# Patient Record
Sex: FEMALE | Race: WHITE | Employment: FULL TIME | URBAN - NONMETROPOLITAN AREA
[De-identification: names, ages, dates, MRNs, and addresses within clinical notes are randomized per-mention and may not be internally consistent; named-entity substitution may affect disease eponyms.]

---

## 2016-08-23 LAB — HBA1C MFR BLD: 6.7 %

## 2016-08-24 LAB
CHOLESTEROL, TOTAL: 172 MG/DL
CHOLESTEROL/HDL RATIO: 3.4
HDLC SERPL-MCNC: 51 MG/DL (ref 35–70)
LDL CHOLESTEROL CALCULATED: 95 MG/DL (ref 0–160)
TRIGL SERPL-MCNC: 129 MG/DL
VLDLC SERPL CALC-MCNC: 26 MG/DL

## 2017-02-27 ENCOUNTER — OFFICE VISIT (OUTPATIENT)
Dept: OTHER | Age: 47
End: 2017-02-27

## 2017-02-27 VITALS — WEIGHT: 201.4 LBS | HEIGHT: 63 IN | BODY MASS INDEX: 35.68 KG/M2

## 2017-02-27 DIAGNOSIS — Z71.3 DIETARY COUNSELING AND SURVEILLANCE: Primary | ICD-10-CM

## 2017-02-27 DIAGNOSIS — E11.9 TYPE 2 DIABETES MELLITUS WITHOUT COMPLICATION, UNSPECIFIED LONG TERM INSULIN USE STATUS: ICD-10-CM

## 2017-02-27 PROCEDURE — 97802 MEDICAL NUTRITION INDIV IN: CPT | Performed by: DIETITIAN, REGISTERED

## 2017-04-05 ENCOUNTER — OFFICE VISIT (OUTPATIENT)
Dept: OTHER | Age: 47
End: 2017-04-05

## 2017-04-05 VITALS — WEIGHT: 197.8 LBS | BODY MASS INDEX: 35.05 KG/M2 | HEIGHT: 63 IN

## 2017-04-05 DIAGNOSIS — E11.9 TYPE 2 DIABETES MELLITUS WITHOUT COMPLICATION, UNSPECIFIED LONG TERM INSULIN USE STATUS: Primary | ICD-10-CM

## 2017-04-05 DIAGNOSIS — Z71.3 DIETARY COUNSELING AND SURVEILLANCE: ICD-10-CM

## 2017-12-07 ENCOUNTER — HOSPITAL ENCOUNTER (OUTPATIENT)
Dept: WOMENS IMAGING | Age: 47
Discharge: HOME | End: 2017-12-07
Payer: COMMERCIAL

## 2017-12-07 DIAGNOSIS — Z13.9 VISIT FOR SCREENING: ICD-10-CM

## 2017-12-07 PROCEDURE — 77063 BREAST TOMOSYNTHESIS BI: CPT

## 2017-12-21 ENCOUNTER — HOSPITAL ENCOUNTER (EMERGENCY)
Age: 47
Discharge: HOME | End: 2017-12-21
Attending: EMERGENCY MEDICINE
Payer: COMMERCIAL

## 2017-12-21 ENCOUNTER — APPOINTMENT (OUTPATIENT)
Dept: CT IMAGING | Age: 47
End: 2017-12-21
Payer: COMMERCIAL

## 2017-12-21 VITALS
WEIGHT: 196.21 LBS | HEART RATE: 88 BPM | SYSTOLIC BLOOD PRESSURE: 133 MMHG | HEIGHT: 66 IN | RESPIRATION RATE: 17 BRPM | OXYGEN SATURATION: 100 % | DIASTOLIC BLOOD PRESSURE: 71 MMHG | BODY MASS INDEX: 31.53 KG/M2

## 2017-12-21 DIAGNOSIS — R07.89 ATYPICAL CHEST PAIN: Primary | ICD-10-CM

## 2017-12-21 LAB
ALBUMIN SERPL-MCNC: 3.5 GM/DL (ref 3.5–5)
ALP BLD-CCNC: 64 U/L (ref 46–116)
ALT SERPL-CCNC: 23 U/L (ref 12–78)
ANION GAP: 10 MEQ/L (ref 8–16)
AST SERPL-CCNC: 21 U/L (ref 15–37)
BASOPHILS # BLD: 0.5 % (ref 0–3)
BILIRUB SERPL-MCNC: 0.3 MG/DL (ref 0.2–1)
BUN BLDV-MCNC: 14 MG/DL (ref 7–18)
CHLORIDE BLD-SCNC: 104 MEQ/L (ref 98–107)
CO2: 24 MEQ/L (ref 21–32)
CREAT SERPL-MCNC: 0.8 MG/DL (ref 0.6–1.1)
EKG ATRIAL RATE: 109 BPM
EKG P AXIS: 63 DEGREES
EKG P-R INTERVAL: 154 MS
EKG Q-T INTERVAL: 348 MS
EKG QRS DURATION: 92 MS
EKG QTC CALCULATION (BAZETT): 468 MS
EKG R AXIS: -27 DEGREES
EKG T AXIS: 50 DEGREES
EKG VENTRICULAR RATE: 109 BPM
EOSINOPHILS RELATIVE PERCENT: 1.9 % (ref 0–4)
GFR, ESTIMATED: 82 ML/MIN/1.73M2
GLUCOSE BLD-MCNC: 156 MG/DL (ref 74–106)
HCT VFR BLD CALC: 40.6 % (ref 37–47)
HEMOGLOBIN: 13.2 GM/DL (ref 12–16)
LIPASE: 138 U/L (ref 65–230)
LYMPHOCYTES # BLD: 18 % (ref 15–47)
MCH RBC QN AUTO: 26.3 PG (ref 27–31)
MCHC RBC AUTO-ENTMCNC: 32.5 GM/DL (ref 33–37)
MCV RBC AUTO: 80.8 FL (ref 81–99)
MONOCYTES: 3.2 % (ref 0–12)
PDW BLD-RTO: 13.8 % (ref 11.5–14.5)
PLATELET # BLD: 313 THOU/MM3 (ref 130–400)
PMV BLD AUTO: 7.6 MCM (ref 7.4–10.4)
POC CALCIUM: 8.7 MG/DL (ref 8.5–10.1)
POTASSIUM SERPL-SCNC: 3.9 MEQ/L (ref 3.5–5.1)
RBC # BLD: 5.03 MILL/MM3 (ref 4.2–5.4)
SEGS: 76.4 % (ref 43–75)
SODIUM BLD-SCNC: 138 MEQ/L (ref 136–145)
TOTAL PROTEIN: 7.6 GM/DL (ref 6.4–8.2)
TROPONIN I: 0.09 NG/ML
WBC # BLD: 9.3 THOU/MM3 (ref 4.8–10.8)

## 2017-12-21 PROCEDURE — 85025 COMPLETE CBC W/AUTO DIFF WBC: CPT

## 2017-12-21 PROCEDURE — 99285 EMERGENCY DEPT VISIT HI MDM: CPT

## 2017-12-21 PROCEDURE — 6360000004 HC RX CONTRAST MEDICATION: Performed by: EMERGENCY MEDICINE

## 2017-12-21 PROCEDURE — 83690 ASSAY OF LIPASE: CPT

## 2017-12-21 PROCEDURE — 80053 COMPREHEN METABOLIC PANEL: CPT

## 2017-12-21 PROCEDURE — 84484 ASSAY OF TROPONIN QUANT: CPT

## 2017-12-21 PROCEDURE — 71275 CT ANGIOGRAPHY CHEST: CPT

## 2017-12-21 PROCEDURE — 93005 ELECTROCARDIOGRAM TRACING: CPT

## 2017-12-21 PROCEDURE — 36415 COLL VENOUS BLD VENIPUNCTURE: CPT

## 2017-12-21 RX ADMIN — IOPAMIDOL 100 ML: 755 INJECTION, SOLUTION INTRAVENOUS at 06:23

## 2017-12-21 ASSESSMENT — HEART SCORE: ECG: 1

## 2017-12-21 ASSESSMENT — PAIN DESCRIPTION - ORIENTATION: ORIENTATION: MID

## 2017-12-21 ASSESSMENT — PAIN DESCRIPTION - LOCATION: LOCATION: CHEST

## 2017-12-21 ASSESSMENT — PAIN DESCRIPTION - DESCRIPTORS: DESCRIPTORS: PRESSURE

## 2017-12-21 ASSESSMENT — PAIN DESCRIPTION - PAIN TYPE: TYPE: ACUTE PAIN

## 2017-12-21 ASSESSMENT — PAIN SCALES - GENERAL: PAINLEVEL_OUTOF10: 5

## 2017-12-21 ASSESSMENT — PAIN DESCRIPTION - PROGRESSION: CLINICAL_PROGRESSION: NOT CHANGED

## 2017-12-21 NOTE — ED NOTES
Pt released ambulatory in stable condition. Resp easy, non-labored. Skin warm, dry. Color pink. AVS and cardiology appt reviewed. Pt voiced understanding.      Yesica Whiting RN  12/21/17 8317

## 2017-12-21 NOTE — ED NOTES
Pt resting on cot. States feeling better at this time. States pain down to 1/10. Respirations remain easy and unlabored. No signs of distress noted.       Bailey Barber RN  12/21/17 3399

## 2017-12-21 NOTE — ED TRIAGE NOTES
Pt states at approximately 2200 last night she developed a rapid heart rate and chest pressure. Rapid heart rate has subsided but chest pressure remains. Denies any shortness of breath or other symptoms.

## 2017-12-21 NOTE — ED NOTES
Pt back to room per wheelchair. Pt states pain remains minimal at this time. Pt informed all lab studies back at this time and awaiting CT results. Pt given water to drink.       Jose Enrique Felix RN  12/21/17 2303

## 2017-12-21 NOTE — ED PROVIDER NOTES
eMERGENCY dEPARTMENT eNCOUnter      [address] [facility]    Chief Complaint   Patient presents with    Chest Pain       HPI    Celia Simon is a 52 y.o. female who presents  Above-noted complaint. Patient presents with chest pain. States she felt rapid heartbeat with a pulse rate in the 120s and didn't think much of a tonic can be distance of more anxiety she's had the past.  She then developed some having some chest pain and discomfort may be some pain into her back. Describes it as pain about 4 out of 10. No associated weakness nausea vomiting or diaphoresis although felt like it could be a little like reflux. She took a Mylanta without a lot of changes. PAST MEDICAL HISTORY    Past Medical History:   Diagnosis Date    Diabetes mellitus ([facility] [address]        SURGICAL HISTORY    Past Surgical History:   Procedure Laterality Date     SECTION      CHOLECYSTECTOMY      DILATION AND CURETTAGE OF UTERUS     [address]?  TUBAL LIGATION         CURRENT MEDICATIONS    Current Outpatient Rx   Medication Sig Dispense Refill    Empagliflozin-Metformin HCl (SYNJARDY PO) Take by mouth 2 times daily 500 mg tablet 1 tablet am and 1tablet pm      escitalopram (LEXAPRO) 10 MG tablet Take 10 mg by mouth See Admin Instructions. 1/2 TABLET DAILY.  MULTIPLE VITAMIN PO Take  by mouth daily.  Cyanocobalamin (VITAMIN B 12 PO) Take  by mouth daily.  Cholecalciferol (VITAMIN D PO) Take  by mouth daily.            ALLERGIES    Allergies   Allergen Reactions    Cortizone-10 [Hydrocortisone]     Decongestant [Albertsons Di Bromm]     Red Dye        FAMILY HISTORY    Family History   Problem Relation Age of Onset    Breast Cancer Neg Hx     Ovarian Cancer Neg Hx        SOCIAL HISTORY    Social History     Social History    Marital status:      Spouse name: N/A    Number of children: N/A    Years of education: N/A     Social History Main Topics    Smoking status: Never Smoker    Smokeless tobacco: Never Used    Alcohol use No    Drug use: No    Sexual activity: Not Asked     Other Topics Concern    None     Social History Narrative    None       REVIEW OF SYSTEMS    Positive for chest pain. No abdominal pain. No urinary symptoms. No dysuria hematuria. All systems negative except as marked. PHYSICAL EXAM    VITAL SIGNS: BP (!) 144/75   Pulse 97   Resp 15   Ht 5' 6\" (1.676 m)   Wt 196 lb 3.4 oz (89 kg)   SpO2 99%   BMI 31.67 kg/m²    Constitutional:  Alert not toxtic or ill,    HENT:  Normocephalic, Atraumatic, Bilateral external ears normal, Oropharynx moist, No oral exudates, Nose normal.  Cervical Spine: Normal range of motion,  No stridor. No tenderness, Supple,  Eyes:  No discharge or  Swelling,Conjunctiva normal, PERRL, EOMI,  Respiratory: No respiratory distress, Normal breath sounds,  No wheezing, No chest tenderness. Cardiovascular:  Normal heart rate, Normal rhythm, No murmurs, No rubs, No gallops. GI:  No reproducible pain, Bowel sounds normal, Soft, No masses, No pulsatile masses. No tenderness  Musculoskeletal:  Intact distal pulses, No edema, No tenderness, No cyanosis, No clubbing. Good range of motion in all major joints. No tenderness to palpation or major deformities noted. Back:No tenderness. Integument:  Warm, Dry, No erythema, No rash (on exposed areas)   Lymphatic:  No lymphadenopathy noted. Neurologic:  Alert & oriented x 3, Normal motor function, Normal sensory function, No focal deficits noted. Psychiatric:  Affect normal, Judgment normal, Mood normal.     EKG    Sinus tachycardia 109. No ischemia or infarction. No old EKGs to compare to.   Left anterior fascicular block    Heart Score for chest pain patients  History: Slightly Suspicious  ECG: Non-Specifc repolarization disturbance/LBTB/PM  Patient Age: > 39 and < 65 years  *Risk factors for Atherosclerotic disease: Diabetes Mellitus, Positive family History  Risk Factors: > 3 Risk factors or history of atherosclerotic disease*  Troponin: < 1X normal limit  Heart Score Total: 4             RADIOLOGY    CTA Chest W WO Contrast   Final Result   1. No pulmonary emboli or pulmonary infiltrates. 2.  Inferior left thyroid nodule. Nonemergent follow-up thyroid ultrasound and clinical follow-up recommended. **This report has been created using voice recognition software. It may contain minor errors which are inherent in voice recognition technology. **      Final report electronically signed by Dr. Leticia Leos on 12/21/2017 6:48 AM          PROCEDURES    none      CONSULTS:  None      CRITICAL CARE:  None      ED COURSE & MEDICAL DECISION MAKING    Pertinent Labs & Imaging studies reviewed. (See chart for details)  Patient present with some chest pain. She started with some palpitations around 10:00 tonight. Then developed into some chest discomfort. She wasn't sure because a reflux or some other symptoms. States she's had a prior workup in the past when she was diagnosed with some anxiety over a few years back. She denies other symptoms at this time. She has she feels a lot better although she is having some back pain. REASSESSMENT  Patient rechecked and updated on lab/xray status, progress and results. .  Patient was reassessed and condition was improved after tx. No further needs at this time. Without intervention patient is doing much better. Actually sitting patient that the patient actually a lot more relief of her chest and back pain at this time. There is a reflux component. We did do blood testing here including cardiac enzymes which did not come across a computer but are negative. CT the chest to assess aorta and PE was negative given the back pain at this time. Her heart score is 4 and I felt she could be stably discharged. She feels comfortable going home. Recommend rest and follow-up.   We have arranged cardiac evaluation tomorrow at 10 AM.    FINAL

## 2017-12-22 ENCOUNTER — OFFICE VISIT (OUTPATIENT)
Dept: CARDIOLOGY CLINIC | Age: 47
End: 2017-12-22
Payer: COMMERCIAL

## 2017-12-22 VITALS
HEART RATE: 100 BPM | HEIGHT: 63 IN | WEIGHT: 197 LBS | BODY MASS INDEX: 34.91 KG/M2 | SYSTOLIC BLOOD PRESSURE: 150 MMHG | DIASTOLIC BLOOD PRESSURE: 88 MMHG

## 2017-12-22 DIAGNOSIS — R07.9 CHEST PAIN, UNSPECIFIED TYPE: Primary | ICD-10-CM

## 2017-12-22 DIAGNOSIS — I10 ESSENTIAL HYPERTENSION: ICD-10-CM

## 2017-12-22 PROCEDURE — 99203 OFFICE O/P NEW LOW 30 MIN: CPT | Performed by: NUCLEAR MEDICINE

## 2017-12-22 ASSESSMENT — ENCOUNTER SYMPTOMS
RECTAL PAIN: 0
FACIAL SWELLING: 0
NAUSEA: 0
VOMITING: 0
CHEST TIGHTNESS: 1
ANAL BLEEDING: 0
ABDOMINAL DISTENTION: 0
ABDOMINAL PAIN: 0
PHOTOPHOBIA: 0
SHORTNESS OF BREATH: 1
DIARRHEA: 0
BLOOD IN STOOL: 0
BACK PAIN: 0
CONSTIPATION: 0

## 2017-12-22 NOTE — PROGRESS NOTES
New pt here for fu from ER for chest pain back pain   Also felt like her heart was racing   Thinks is was more anxiety   Denies SOB, dizziness  Saw a cardiologist in 2009 for the same symptoms

## 2017-12-22 NOTE — PROGRESS NOTES
[facility] PROFESSIONAL SERVS  [facility]  [address] [facility] [place] [address]  Dept: [phone]  Dept Fax: [phone]  Loc: [phone]    Visit Date: 12/22/2017    Ginna Wyatt is a 52 y.o. female who presents today for:  Chief Complaint   Patient presents with    New Patient     chest pain    Chest Pain    Diabetes     Here for first time  Post ER visits  Started last mid night   Started with tachycardia at night  Then chest pain   Last ed for few hours  No associated dizziness  Had no arrhythmia with it   Similar episode 2009   Diagnosed with anxiety   No smoking  No known CAD  Known DM for several years  No hyperlipidemia  Higher BP today       HPI:  HPI  Past Medical History:   Diagnosis Date    Diabetes mellitus ([address]       Past Surgical History:   Procedure Laterality Date    [address]?  TUBAL LIGATION  2002     Family History   Problem Relation Age of Onset    High Blood Pressure Mother     Diabetes Father     Breast Cancer Neg Hx     Ovarian Cancer Neg Hx      Social History   Substance Use Topics    Smoking status: Never Smoker    Smokeless tobacco: Never Used    Alcohol use Yes      Comment: social       Current Outpatient Prescriptions   Medication Sig Dispense Refill    Probiotic Product (PROBIOTIC ADVANCED PO) Take by mouth      Empagliflozin-Metformin HCl (SYNJARDY PO) Take by mouth 2 times daily 500 mg tablet 1 tablet am and 1tablet pm      escitalopram (LEXAPRO) 10 MG tablet Take 10 mg by mouth See Admin Instructions. 1/2 TABLET DAILY.  MULTIPLE VITAMIN PO Take  by mouth daily.  Cyanocobalamin (VITAMIN B 12 PO) Take  by mouth daily.  Cholecalciferol (VITAMIN D PO) Take  by mouth daily. No current facility-administered medications for this visit.       Allergies   Allergen Reactions    Cortizone-10 [Hydrocortisone]     She has no rales. She exhibits no tenderness. Abdominal: She exhibits no distension and no mass. There is no tenderness. There is no rebound and no guarding. Musculoskeletal: She exhibits no edema. Lymphadenopathy:     She has no cervical adenopathy. Neurological: She is oriented to person, place, and time. No cranial nerve deficit. Psychiatric: Her behavior is normal.     BP (!) 150/88   Pulse 100   Ht 5' 3\" (1.6 m)   Wt 197 lb (89.4 kg)   BMI 34.90 kg/m²     Assessment:     1. Chest pain, unspecified type     2. Essential hypertension     one episode of symptoms as above  Relatively atypical   Low to moderate risk   ECG reviewed with no findings really     Plan:  No Follow-up on file. We discussed options  Monitoring vs doing redo testing   Echo   Event monitor and stress test   Continue risk factor modification and medical management  Thank you for allowing me to participate in the care of your patient. Please don't hesitate to contact me regarding any further issues related to the patient care    Orders Placed:  No orders of the defined types were placed in this encounter. Medications Prescribed:  No orders of the defined types were placed in this encounter. Discussed use, benefit, and side effects of prescribed medications. All patient questions answered. Pt voiced understanding. Instructed to continue current medications, diet and exercise. Continue risk factor modification and medical management. Patient agreed with treatment plan. Follow up as directed.     Electronically signed by Cynthia Robbins MD on 12/22/2017 at 10:25 AM

## 2018-01-10 ENCOUNTER — HOSPITAL ENCOUNTER (OUTPATIENT)
Dept: NON INVASIVE DIAGNOSTICS | Age: 48
Discharge: HOME | End: 2018-01-10
Payer: COMMERCIAL

## 2018-01-10 VITALS — WEIGHT: 197 LBS | HEIGHT: 63 IN | BODY MASS INDEX: 34.91 KG/M2

## 2018-01-10 DIAGNOSIS — R07.9 CHEST PAIN, UNSPECIFIED TYPE: ICD-10-CM

## 2018-01-10 DIAGNOSIS — I10 ESSENTIAL HYPERTENSION: ICD-10-CM

## 2018-01-10 LAB
LV EF: 60 %
LV EF: 69 %
LVEF MODALITY: NORMAL
LVEF MODALITY: NORMAL

## 2018-01-10 PROCEDURE — A9500 TC99M SESTAMIBI: HCPCS | Performed by: NUCLEAR MEDICINE

## 2018-01-10 PROCEDURE — 93017 CV STRESS TEST TRACING ONLY: CPT | Performed by: NUCLEAR MEDICINE

## 2018-01-10 PROCEDURE — 78452 HT MUSCLE IMAGE SPECT MULT: CPT

## 2018-01-10 PROCEDURE — 93306 TTE W/DOPPLER COMPLETE: CPT

## 2018-01-10 PROCEDURE — 3430000000 HC RX DIAGNOSTIC RADIOPHARMACEUTICAL: Performed by: NUCLEAR MEDICINE

## 2018-01-10 RX ADMIN — Medication 33.1 MILLICURIE: at 14:51

## 2018-01-10 RX ADMIN — Medication 9.8 MILLICURIE: at 13:43

## 2018-08-27 ENCOUNTER — HOSPITAL ENCOUNTER (OUTPATIENT)
Age: 48
Discharge: HOME | End: 2018-08-27
Payer: COMMERCIAL

## 2018-08-27 LAB
ALBUMIN SERPL-MCNC: 4.3 G/DL (ref 3.5–5.1)
ALP BLD-CCNC: 66 U/L (ref 38–126)
ALT SERPL-CCNC: 12 U/L (ref 11–66)
ANION GAP SERPL CALCULATED.3IONS-SCNC: 15 MEQ/L (ref 8–16)
AST SERPL-CCNC: 13 U/L (ref 5–40)
AVERAGE GLUCOSE: 129 MG/DL (ref 70–126)
BASOPHILS # BLD: 0.8 %
BASOPHILS ABSOLUTE: 0 THOU/MM3 (ref 0–0.1)
BILIRUB SERPL-MCNC: 0.4 MG/DL (ref 0.3–1.2)
BILIRUBIN DIRECT: < 0.2 MG/DL (ref 0–0.3)
BUN BLDV-MCNC: 13 MG/DL (ref 7–22)
CALCIUM SERPL-MCNC: 9.3 MG/DL (ref 8.5–10.5)
CHLORIDE BLD-SCNC: 104 MEQ/L (ref 98–111)
CHOLESTEROL, FASTING: 186 MG/DL (ref 100–199)
CO2: 21 MEQ/L (ref 23–33)
CREAT SERPL-MCNC: 0.7 MG/DL (ref 0.4–1.2)
EOSINOPHIL # BLD: 3.7 %
EOSINOPHILS ABSOLUTE: 0.2 THOU/MM3 (ref 0–0.4)
ERYTHROCYTE [DISTWIDTH] IN BLOOD BY AUTOMATED COUNT: 14.7 % (ref 11.5–14.5)
ERYTHROCYTE [DISTWIDTH] IN BLOOD BY AUTOMATED COUNT: 46.7 FL (ref 35–45)
GFR SERPL CREATININE-BSD FRML MDRD: 89 ML/MIN/1.73M2
GLUCOSE FASTING: 124 MG/DL (ref 70–108)
HBA1C MFR BLD: 6.3 % (ref 4.4–6.4)
HCT VFR BLD CALC: 43.4 % (ref 37–47)
HDLC SERPL-MCNC: 56 MG/DL
HEMOGLOBIN: 13.7 GM/DL (ref 12–16)
IMMATURE GRANS (ABS): 0.02 THOU/MM3 (ref 0–0.07)
IMMATURE GRANULOCYTES: 0.3 %
LDL CHOLESTEROL CALCULATED: 105 MG/DL
LYMPHOCYTES # BLD: 26.7 %
LYMPHOCYTES ABSOLUTE: 1.6 THOU/MM3 (ref 1–4.8)
MCH RBC QN AUTO: 27.3 PG (ref 26–33)
MCHC RBC AUTO-ENTMCNC: 31.6 GM/DL (ref 32.2–35.5)
MCV RBC AUTO: 86.5 FL (ref 81–99)
MONOCYTES # BLD: 7.3 %
MONOCYTES ABSOLUTE: 0.4 THOU/MM3 (ref 0.4–1.3)
NUCLEATED RED BLOOD CELLS: 0 /100 WBC
PLATELET # BLD: 317 THOU/MM3 (ref 130–400)
PMV BLD AUTO: 10.1 FL (ref 9.4–12.4)
POTASSIUM SERPL-SCNC: 4 MEQ/L (ref 3.5–5.2)
RBC # BLD: 5.02 MILL/MM3 (ref 4.2–5.4)
SEG NEUTROPHILS: 61.2 %
SEGMENTED NEUTROPHILS ABSOLUTE COUNT: 3.7 THOU/MM3 (ref 1.8–7.7)
SODIUM BLD-SCNC: 140 MEQ/L (ref 135–145)
TOTAL PROTEIN: 7.4 G/DL (ref 6.1–8)
TRIGLYCERIDE, FASTING: 127 MG/DL (ref 0–199)
TSH SERPL DL<=0.05 MIU/L-ACNC: 2.81 UIU/ML (ref 0.4–4.2)
WBC # BLD: 6.1 THOU/MM3 (ref 4.8–10.8)

## 2018-08-27 PROCEDURE — 85025 COMPLETE CBC W/AUTO DIFF WBC: CPT

## 2018-08-27 PROCEDURE — 84443 ASSAY THYROID STIM HORMONE: CPT

## 2018-08-27 PROCEDURE — 83036 HEMOGLOBIN GLYCOSYLATED A1C: CPT

## 2018-08-27 PROCEDURE — 36415 COLL VENOUS BLD VENIPUNCTURE: CPT

## 2018-08-27 PROCEDURE — 80061 LIPID PANEL: CPT

## 2018-08-27 PROCEDURE — 80076 HEPATIC FUNCTION PANEL: CPT

## 2018-08-27 PROCEDURE — 80048 BASIC METABOLIC PNL TOTAL CA: CPT

## 2018-09-28 ENCOUNTER — HOSPITAL ENCOUNTER (OUTPATIENT)
Dept: ULTRASOUND IMAGING | Age: 48
Discharge: HOME | End: 2018-09-28
Payer: COMMERCIAL

## 2018-09-28 DIAGNOSIS — E04.1 THYROID NODULE: ICD-10-CM

## 2018-09-28 PROCEDURE — 76536 US EXAM OF HEAD AND NECK: CPT

## 2018-10-12 ENCOUNTER — HOSPITAL ENCOUNTER (OUTPATIENT)
Dept: ULTRASOUND IMAGING | Age: 48
Discharge: HOME | End: 2018-10-12
Payer: COMMERCIAL

## 2018-10-12 DIAGNOSIS — E04.1 THYROID NODULE: ICD-10-CM

## 2018-10-12 PROCEDURE — 88177 CYTP FNA EVAL EA ADDL: CPT

## 2018-10-12 PROCEDURE — 88172 CYTP DX EVAL FNA 1ST EA SITE: CPT

## 2018-10-12 PROCEDURE — 88173 CYTOPATH EVAL FNA REPORT: CPT

## 2018-10-12 PROCEDURE — 60300 ASPIR/INJ THYROID CYST: CPT

## 2018-10-12 NOTE — BRIEF OP NOTE
Brief Postoperative Note    Roque Adames  YOB: 1970  [id number]    Pre-operative Diagnosis: Thyroid nodules    Post-operative Diagnosis: Same    Procedure: US FNA biopsy    Anesthesia: Local    Surgeons/Assistants: PAUL Gaston DO    Estimated Blood Loss: less than 50     Complications: None    Specimens: Was Obtained: with on site cytopathology evaluation    Findings: Full report to follow in PACS. Electronically signed by PAUL Cornelius DO on 10/12/2018 at 11:10 AM

## 2019-05-06 ENCOUNTER — HOSPITAL ENCOUNTER (EMERGENCY)
Age: 49
Discharge: HOME | End: 2019-05-06
Attending: EMERGENCY MEDICINE
Payer: COMMERCIAL

## 2019-05-06 ENCOUNTER — APPOINTMENT (OUTPATIENT)
Dept: CT IMAGING | Age: 49
End: 2019-05-06
Payer: COMMERCIAL

## 2019-05-06 VITALS
DIASTOLIC BLOOD PRESSURE: 78 MMHG | BODY MASS INDEX: 34.91 KG/M2 | WEIGHT: 197 LBS | HEART RATE: 77 BPM | SYSTOLIC BLOOD PRESSURE: 138 MMHG | HEIGHT: 63 IN | RESPIRATION RATE: 10 BRPM | TEMPERATURE: 98.3 F | OXYGEN SATURATION: 100 %

## 2019-05-06 DIAGNOSIS — R00.0 TACHYCARDIA: Primary | ICD-10-CM

## 2019-05-06 LAB
ALBUMIN SERPL-MCNC: 3.8 GM/DL (ref 3.4–5)
ALP BLD-CCNC: 63 U/L (ref 46–116)
ALT SERPL-CCNC: 18 U/L (ref 14–63)
ANION GAP: 14 MEQ/L (ref 8–16)
AST SERPL-CCNC: 16 U/L (ref 15–37)
BASOPHILS # BLD: 0.4 % (ref 0–3)
BILIRUB SERPL-MCNC: 0.4 MG/DL (ref 0.2–1)
BUN BLDV-MCNC: 9 MG/DL (ref 7–18)
CHLORIDE BLD-SCNC: 99 MEQ/L (ref 98–107)
CO2: 24 MEQ/L (ref 21–32)
CREAT SERPL-MCNC: 0.7 MG/DL (ref 0.6–1.3)
EKG ATRIAL RATE: 121 BPM
EKG P AXIS: 62 DEGREES
EKG P-R INTERVAL: 150 MS
EKG Q-T INTERVAL: 322 MS
EKG QRS DURATION: 100 MS
EKG QTC CALCULATION (BAZETT): 457 MS
EKG R AXIS: -34 DEGREES
EKG T AXIS: 56 DEGREES
EKG VENTRICULAR RATE: 121 BPM
EOSINOPHILS RELATIVE PERCENT: 1.9 % (ref 0–4)
GFR, ESTIMATED: > 90 ML/MIN/1.73M2
GLUCOSE BLD-MCNC: 132 MG/DL (ref 74–106)
HCT VFR BLD CALC: 41.9 % (ref 37–47)
HEMOGLOBIN: 13.9 GM/DL (ref 12–16)
LYMPHOCYTES # BLD: 30.8 % (ref 15–47)
MCH RBC QN AUTO: 28.1 PG (ref 27–31)
MCHC RBC AUTO-ENTMCNC: 33 GM/DL (ref 33–37)
MCV RBC AUTO: 85 FL (ref 81–99)
MONOCYTES: 6 % (ref 0–12)
PDW BLD-RTO: 16.3 % (ref 11.5–14.5)
PLATELET # BLD: 326 THOU/MM3 (ref 130–400)
PMV BLD AUTO: 7.9 FL (ref 7.4–10.4)
POC CALCIUM: 9.3 MG/DL (ref 8.5–10.1)
POTASSIUM SERPL-SCNC: 3.5 MEQ/L (ref 3.5–5.1)
RBC # BLD: 4.94 MILL/MM3 (ref 4.2–5.4)
SEGS: 60.9 % (ref 43–75)
SODIUM BLD-SCNC: 137 MEQ/L (ref 136–145)
T4 FREE: 2.19 NG/DL (ref 0.93–1.76)
TOTAL PROTEIN: 7.7 GM/DL (ref 6.4–8.2)
TROPONIN I: < 0.017 NG/ML (ref 0.02–0.05)
TSH SERPL DL<=0.05 MIU/L-ACNC: 0.18 UIU/ML (ref 0.4–4.2)
WBC # BLD: 6.6 THOU/MM3 (ref 4.8–10.8)

## 2019-05-06 PROCEDURE — 36415 COLL VENOUS BLD VENIPUNCTURE: CPT

## 2019-05-06 PROCEDURE — 80053 COMPREHEN METABOLIC PANEL: CPT

## 2019-05-06 PROCEDURE — 84484 ASSAY OF TROPONIN QUANT: CPT

## 2019-05-06 PROCEDURE — 2500000003 HC RX 250 WO HCPCS: Performed by: EMERGENCY MEDICINE

## 2019-05-06 PROCEDURE — 93005 ELECTROCARDIOGRAM TRACING: CPT | Performed by: EMERGENCY MEDICINE

## 2019-05-06 PROCEDURE — 71275 CT ANGIOGRAPHY CHEST: CPT

## 2019-05-06 PROCEDURE — 84443 ASSAY THYROID STIM HORMONE: CPT

## 2019-05-06 PROCEDURE — 2580000003 HC RX 258: Performed by: EMERGENCY MEDICINE

## 2019-05-06 PROCEDURE — 2709999900 HC NON-CHARGEABLE SUPPLY

## 2019-05-06 PROCEDURE — 84439 ASSAY OF FREE THYROXINE: CPT

## 2019-05-06 PROCEDURE — 93010 ELECTROCARDIOGRAM REPORT: CPT | Performed by: NUCLEAR MEDICINE

## 2019-05-06 PROCEDURE — 85025 COMPLETE CBC W/AUTO DIFF WBC: CPT

## 2019-05-06 PROCEDURE — 96374 THER/PROPH/DIAG INJ IV PUSH: CPT

## 2019-05-06 PROCEDURE — 6360000004 HC RX CONTRAST MEDICATION: Performed by: EMERGENCY MEDICINE

## 2019-05-06 PROCEDURE — 99285 EMERGENCY DEPT VISIT HI MDM: CPT

## 2019-05-06 RX ORDER — LEVOTHYROXINE SODIUM 0.15 MG/1
150 TABLET ORAL DAILY
COMMUNITY
End: 2022-01-24 | Stop reason: SDUPTHER

## 2019-05-06 RX ORDER — PIOGLITAZONE HCL AND METFORMIN HCL 500; 15 MG/1; MG/1
1 TABLET ORAL 2 TIMES DAILY WITH MEALS
COMMUNITY
End: 2021-04-06 | Stop reason: SINTOL

## 2019-05-06 RX ORDER — METOPROLOL TARTRATE 5 MG/5ML
5 INJECTION INTRAVENOUS ONCE
Status: COMPLETED | OUTPATIENT
Start: 2019-05-06 | End: 2019-05-06

## 2019-05-06 RX ORDER — LOSARTAN POTASSIUM 50 MG/1
50 TABLET ORAL DAILY
COMMUNITY
End: 2021-04-16

## 2019-05-06 RX ORDER — 0.9 % SODIUM CHLORIDE 0.9 %
1000 INTRAVENOUS SOLUTION INTRAVENOUS ONCE
Status: COMPLETED | OUTPATIENT
Start: 2019-05-06 | End: 2019-05-06

## 2019-05-06 RX ADMIN — METOPROLOL TARTRATE 5 MG: 5 INJECTION INTRAVENOUS at 12:22

## 2019-05-06 RX ADMIN — IOPAMIDOL 100 ML: 755 INJECTION, SOLUTION INTRAVENOUS at 13:00

## 2019-05-06 RX ADMIN — SODIUM CHLORIDE 1000 ML: 9 INJECTION, SOLUTION INTRAVENOUS at 12:22

## 2019-05-06 ASSESSMENT — PAIN DESCRIPTION - ORIENTATION: ORIENTATION: UPPER

## 2019-05-06 ASSESSMENT — PAIN SCALES - GENERAL: PAINLEVEL_OUTOF10: 4

## 2019-05-06 ASSESSMENT — PAIN DESCRIPTION - LOCATION: LOCATION: BACK

## 2019-05-06 NOTE — ED NOTES
Up and about in room. Denies any palpitations. Pt released ambulatory in stable condition. Resp easy, non-labored. Skin warm, dry. Color pink. AVS and scripts reviewed. Pt voiced understanding.      Roxanna Amaya RN  05/06/19 8723

## 2019-05-06 NOTE — ED NOTES
Up to br with out symptoms. Resting quietly in bed. Monitor showing NSR.       Fan Garcia RN  05/06/19 1754

## 2019-05-06 NOTE — ED PROVIDER NOTES
Tammy Conteh  [facility]  [address]  Phone: [phone]    Emergency Department encounter      [address] [facility]    Chief Complaint   Patient presents with    Tachycardia       HPI    Maria Elena Mendez is a 50 y.o. female who presents above-noted complaint. Patient is been doing fine. She has a history of some tachycardia in the past maybe once or twice a year at the most.  She states her pulse was rapid over this last day or so. Rapid for her [id number] or so. She denies other symptoms such as chest pain nausea vomiting . Denies increased stressors other problems. PAST MEDICAL HISTORY    Past Medical History:   Diagnosis Date    Diabetes mellitus ([address]        SURGICAL HISTORY    Past Surgical History:   Procedure Laterality Date     SECTION      CHOLECYSTECTOMY      DILATION AND CURETTAGE OF UTERUS     [address]?  TUBAL LIGATION         CURRENT MEDICATIONS    Current Outpatient Rx   Medication Sig Dispense Refill    losartan (COZAAR) 50 MG tablet Take 50 mg by mouth daily      pioglitazone-metformin (ACTOPLUS MET)  MG per tablet Take 1 tablet by mouth 2 times daily (with meals)      levothyroxine (SYNTHROID) 150 MCG tablet Take 150 mcg by mouth Daily      Probiotic Product (PROBIOTIC ADVANCED PO) Take by mouth      escitalopram (LEXAPRO) 10 MG tablet Take 10 mg by mouth See Admin Instructions. 1/2 TABLET DAILY.  MULTIPLE VITAMIN PO Take  by mouth daily.  Cyanocobalamin (VITAMIN B 12 PO) Take  by mouth daily.  Cholecalciferol (VITAMIN D PO) Take  by mouth daily.            ALLERGIES    Allergies   Allergen Reactions    Cortizone-10 [Hydrocortisone]     Decongestant [Albertsons Di Bromm]     Red Dye        FAMILY HISTORY    Family History   Problem Relation Age of Onset    High Blood Pressure Mother     Diabetes Father     Breast Cancer Neg Hx     Ovarian Cancer Neg Hx        SOCIAL HISTORY Social History     Socioeconomic History    Marital status:      Spouse name: None    Number of children: None    Years of education: None    Highest education level: None   Occupational History    None   Social Needs    Financial resource strain: None    Food insecurity:     Worry: None     Inability: None    Transportation needs:     Medical: None     Non-medical: None   Tobacco Use    Smoking status: Never Smoker    Smokeless tobacco: Never Used   Substance and Sexual Activity    Alcohol use: Yes     Comment: social     Drug use: No    Sexual activity: None   Lifestyle    Physical activity:     Days per week: None     Minutes per session: None    Stress: None   Relationships    Social connections:     Talks on phone: None     Gets together: None     Attends Cheondoism service: None     Active member of club or organization: None     Attends meetings of clubs or organizations: None     Relationship status: None    Intimate partner violence:     Fear of current or ex partner: None     Emotionally abused: None     Physically abused: None     Forced sexual activity: None   Other Topics Concern    None   Social History Narrative    None       REVIEW OF SYSTEMS    Positive for palpitations. No bowel or bladder habit changes. No weakness some significant tingling. No syncope. All systems negative except as marked. PHYSICAL EXAM    VITAL SIGNS: /82   Pulse 82   Temp 98.3 °F (36.8 °C) (Oral)   Resp 10   Ht 5' 3\" (1.6 m)   Wt 197 lb (89.4 kg)   LMP 03/25/2019   SpO2 100%   BMI 34.90 kg/m²    Constitutional:  Alert not toxtic or ill, able to give coherent history  HENT:  Normocephalic, Atraumatic, Bilateral external ears normal, Oropharynx moist, No oral exudates, Nose normal.  Cervical Spine: Normal range of motion,  No stridor.  No tenderness, Supple,  Eyes:  No discharge or  Swelling,Conjunctiva normal, PERRL, EOMI,  Respiratory: No respiratory distress, Normal breath

## 2019-05-06 NOTE — ED NOTES
Pt assisted to room per w/c. Pt drove self here. States she had tachycardia all night. States she get this periodically. States she had thyroid removed and is still getting adjusted to levothyroxine. Pt started on a new bp pill and thinks this messes with her  Thyroid meds. Pt pink, warm, dry. Resp easy.        Ruby Russell RN  05/06/19 5007

## 2019-06-20 ENCOUNTER — HOSPITAL ENCOUNTER (EMERGENCY)
Age: 49
Discharge: HOME | End: 2019-06-20
Attending: FAMILY MEDICINE
Payer: COMMERCIAL

## 2019-06-20 VITALS
OXYGEN SATURATION: 96 % | DIASTOLIC BLOOD PRESSURE: 76 MMHG | SYSTOLIC BLOOD PRESSURE: 136 MMHG | TEMPERATURE: 98.7 F | HEART RATE: 82 BPM | RESPIRATION RATE: 11 BRPM

## 2019-06-20 DIAGNOSIS — R00.2 PALPITATIONS: Primary | ICD-10-CM

## 2019-06-20 LAB
ANION GAP: 10 MEQ/L (ref 8–16)
BASOPHILS # BLD: 0.3 % (ref 0–3)
BUN BLDV-MCNC: 17 MG/DL (ref 7–18)
CHLORIDE BLD-SCNC: 106 MEQ/L (ref 98–107)
CO2: 25 MEQ/L (ref 21–32)
CREAT SERPL-MCNC: 0.7 MG/DL (ref 0.6–1.3)
EKG ATRIAL RATE: 111 BPM
EKG P AXIS: 56 DEGREES
EKG P-R INTERVAL: 150 MS
EKG Q-T INTERVAL: 352 MS
EKG QRS DURATION: 100 MS
EKG QTC CALCULATION (BAZETT): 478 MS
EKG R AXIS: -19 DEGREES
EKG T AXIS: 47 DEGREES
EKG VENTRICULAR RATE: 111 BPM
EOSINOPHILS RELATIVE PERCENT: 3 % (ref 0–4)
GFR, ESTIMATED: > 90 ML/MIN/1.73M2
GLUCOSE BLD-MCNC: 141 MG/DL (ref 74–106)
HCT VFR BLD CALC: 37.9 % (ref 37–47)
HEMOGLOBIN: 12.3 GM/DL (ref 12–16)
LYMPHOCYTES # BLD: 18 % (ref 15–47)
MCH RBC QN AUTO: 28.9 PG (ref 27–31)
MCHC RBC AUTO-ENTMCNC: 32.5 GM/DL (ref 33–37)
MCV RBC AUTO: 88.8 FL (ref 81–99)
MONOCYTES: 4.9 % (ref 0–12)
NT PRO BNP: 119 PG/ML (ref 0–450)
PDW BLD-RTO: 15.7 % (ref 11.5–14.5)
PLATELET # BLD: 316 THOU/MM3 (ref 130–400)
PMV BLD AUTO: 7.7 FL (ref 7.4–10.4)
POC CALCIUM: 8.7 MG/DL (ref 8.5–10.1)
POTASSIUM SERPL-SCNC: 4.1 MEQ/L (ref 3.5–5.1)
RBC # BLD: 4.27 MILL/MM3 (ref 4.2–5.4)
SEGS: 73.8 % (ref 43–75)
SODIUM BLD-SCNC: 141 MEQ/L (ref 136–145)
TROPONIN I: < 0.017 NG/ML (ref 0.02–0.05)
TSH SERPL DL<=0.05 MIU/L-ACNC: 0.62 UIU/ML (ref 0.4–4.2)
WBC # BLD: 7.4 THOU/MM3 (ref 4.8–10.8)

## 2019-06-20 PROCEDURE — 83880 ASSAY OF NATRIURETIC PEPTIDE: CPT

## 2019-06-20 PROCEDURE — 84443 ASSAY THYROID STIM HORMONE: CPT

## 2019-06-20 PROCEDURE — 93005 ELECTROCARDIOGRAM TRACING: CPT | Performed by: FAMILY MEDICINE

## 2019-06-20 PROCEDURE — 84484 ASSAY OF TROPONIN QUANT: CPT

## 2019-06-20 PROCEDURE — 80048 BASIC METABOLIC PNL TOTAL CA: CPT

## 2019-06-20 PROCEDURE — 2580000003 HC RX 258: Performed by: FAMILY MEDICINE

## 2019-06-20 PROCEDURE — 85025 COMPLETE CBC W/AUTO DIFF WBC: CPT

## 2019-06-20 PROCEDURE — 99284 EMERGENCY DEPT VISIT MOD MDM: CPT

## 2019-06-20 PROCEDURE — 2709999900 HC NON-CHARGEABLE SUPPLY

## 2019-06-20 PROCEDURE — 93010 ELECTROCARDIOGRAM REPORT: CPT | Performed by: INTERNAL MEDICINE

## 2019-06-20 PROCEDURE — 36415 COLL VENOUS BLD VENIPUNCTURE: CPT

## 2019-06-20 RX ORDER — 0.9 % SODIUM CHLORIDE 0.9 %
1000 INTRAVENOUS SOLUTION INTRAVENOUS ONCE
Status: COMPLETED | OUTPATIENT
Start: 2019-06-20 | End: 2019-06-20

## 2019-06-20 RX ADMIN — SODIUM CHLORIDE 1000 ML: 9 INJECTION, SOLUTION INTRAVENOUS at 01:24

## 2019-06-20 ASSESSMENT — ENCOUNTER SYMPTOMS
SHORTNESS OF BREATH: 0
DIARRHEA: 0
EYE REDNESS: 0
COUGH: 0
VOMITING: 0
BACK PAIN: 0
ABDOMINAL PAIN: 0
EYE DISCHARGE: 0
NAUSEA: 1

## 2019-06-20 NOTE — ED TRIAGE NOTES
Presents with heart palaptations, heart racing. started around 10pm last night. States may be due from new diabetic med, shes been on for 5-6 months, or from her thyroidectomy, done in October. Was in the er a few months ago for palpitations as well. Pt states actos-met does have a side effect of swelling, which pt is experiencing. Pt does have swelling to face/cheeks, thighs and knee areas.

## 2019-06-20 NOTE — ED NOTES
Pt did state she has issues with anxiety, and when she noticed her legs swelling she could tell her heart rate started elevating.  Hr is now in the [address], RN  06/20/19 2020

## 2019-06-20 NOTE — ED PROVIDER NOTES
[address] [facility]  eMERGENCY dEPARTMENT eNCOUnter          CHIEF COMPLAINT     Palpitations      Nurses Notes reviewed and I agree except as noted in the HPI. HISTORY OF PRESENT ILLNESS    Duncan Jay is a 50 y.o. female who presents for evaluation of palpitations. She felt as if her heart was racing. Symptoms started around 10 PM yesterday evening. Patient states that she has experienced this in the past and it was likely secondary to being hyperthyroid. Patient states that her thyroid cancer doctor adjusted her medication a couple of months ago. Patient states she does experience anxiety and is not sure if this is also contributing. She states that she is also been noticing some swelling over the past week, an 8 pound weight gain. She states that she has swelling to her face and thighs. No increased salt intake or sedentary activity. She thinks it is secondary to her diabetic medication, actos-metformn. REVIEW OF SYSTEMS     Review of Systems   Constitutional: Negative for chills and fever. Eyes: Negative for discharge and redness. Respiratory: Negative for cough and shortness of breath. Cardiovascular: Positive for palpitations and leg swelling. Negative for chest pain. Gastrointestinal: Positive for nausea. Negative for abdominal pain, diarrhea and vomiting. Musculoskeletal: Positive for myalgias. Negative for back pain. Skin: Negative for rash and wound. Psychiatric/Behavioral: Negative for confusion. The patient is nervous/anxious. PAST MEDICAL HISTORY    has a past medical history of Diabetes mellitus ([address] SURGICAL HISTORY      has a past surgical history that includes Dilation and curettage of uterus (); Cholecystectomy (); sinus surgery (?);  section (); and Tubal ligation ().     CURRENT MEDICATIONS       Discharge Medication List as of 2019  1:55 AM      CONTINUE these medications which have NOT CHANGED    Details and vitals reviewed. DIFFERENTIAL DIAGNOSIS:   Sinus tachycardia, anxiety, electrolyte abnormality    DIAGNOSTIC RESULTS     EKG: All EKG's are interpreted by the Emergency Department Physician whoeither signs or Co-signs this chart in the absence of a cardiologist.  Sinus tachycardia with heart rate 111. OH interval 150. QRS duration 100. QTc 470. Axis -19. LABS:   Labs Reviewed   CBC WITH AUTO DIFFERENTIAL - Abnormal; Notable for the following components:       Result Value    MCHC 32.5 (*)     RDW 15.7 (*)     All other components within normal limits   BASIC METABOLIC PANEL - Abnormal; Notable for the following components:    Glucose 141 (*)     All other components within normal limits   TSH WITH REFLEX   BRAIN NATRIURETIC PEPTIDE   TROPONIN   GLOMERULAR FILTRATION RATE, ESTIMATED   ANION GAP       EMERGENCY DEPARTMENT COURSE:   Vitals:    Vitals:    06/20/19 0100 06/20/19 0122 06/20/19 0131 06/20/19 0140   BP: (!) 156/74 (!) 141/71  136/76   Pulse: 105 86  82   Resp: 17 12  11   Temp:   98.7 °F (37.1 °C)    SpO2: 98% 96%  96%     MDM  Patient seen and evaluated in the department for palpitations. Appropriate labs were ordered and reviewed. Patient was treated in the department with 0.9% NS. I considered discharge to be an appropriate decision based on the patient's condition. Patient was discharged home in stable condition with recommended follow up with their PCP. CRITICAL CARE:   None    CONSULTS:  None    PROCEDURES:  None    FINAL IMPRESSION      1.  Palpitations          DISPOSITION/PLAN   Discharge    PATIENT REFERRED TO:  Bel Vega MD  [facility] [address] [address] [phone]    Schedule an appointment as soon as possible for a visit in 5 days  edema and palpitations      DISCHARGEMEDICATIONS:  Discharge Medication List as of 6/20/2019  1:55 AM          (Please note that portions of this note were completedwith a voice recognition program.  Efforts were made to edit the dictations but occasionally words are mis-transcribed.)    MD Emi Martin MD  06/21/19 6423

## 2019-07-16 ENCOUNTER — NURSE ONLY (OUTPATIENT)
Dept: LAB | Age: 49
End: 2019-07-16

## 2019-07-16 ENCOUNTER — HOSPITAL ENCOUNTER (OUTPATIENT)
Age: 49
Discharge: HOME | End: 2019-07-16
Payer: COMMERCIAL

## 2019-07-16 LAB — TSH SERPL DL<=0.05 MIU/L-ACNC: 0.15 UIU/ML (ref 0.4–4.2)

## 2019-07-20 LAB — THYROGLOBULIN: NORMAL

## 2019-08-27 ENCOUNTER — NURSE ONLY (OUTPATIENT)
Dept: LAB | Age: 49
End: 2019-08-27

## 2019-08-27 LAB
T4 FREE: 1.51 NG/DL (ref 0.93–1.76)
TSH SERPL DL<=0.05 MIU/L-ACNC: 0.48 UIU/ML (ref 0.4–4.2)

## 2019-09-21 ENCOUNTER — HOSPITAL ENCOUNTER (EMERGENCY)
Age: 49
Discharge: HOME | End: 2019-09-21
Attending: FAMILY MEDICINE
Payer: COMMERCIAL

## 2019-09-21 ENCOUNTER — APPOINTMENT (OUTPATIENT)
Dept: GENERAL RADIOLOGY | Age: 49
End: 2019-09-21
Payer: COMMERCIAL

## 2019-09-21 VITALS
WEIGHT: 198 LBS | BODY MASS INDEX: 35.08 KG/M2 | OXYGEN SATURATION: 98 % | RESPIRATION RATE: 13 BRPM | TEMPERATURE: 98.1 F | DIASTOLIC BLOOD PRESSURE: 67 MMHG | HEART RATE: 82 BPM | HEIGHT: 63 IN | SYSTOLIC BLOOD PRESSURE: 131 MMHG

## 2019-09-21 DIAGNOSIS — R00.2 PALPITATIONS: Primary | ICD-10-CM

## 2019-09-21 LAB
ALBUMIN SERPL-MCNC: 3.5 GM/DL (ref 3.4–5)
ALP BLD-CCNC: 69 U/L (ref 46–116)
ALT SERPL-CCNC: 17 U/L (ref 14–63)
ANION GAP: 9 MEQ/L (ref 8–16)
AST SERPL-CCNC: 15 U/L (ref 15–37)
BASOPHILS # BLD: 0.3 % (ref 0–3)
BILIRUB SERPL-MCNC: 0.2 MG/DL (ref 0.2–1)
BILIRUBIN DIRECT: < 0.1 MG/DL (ref 0–0.2)
BUN BLDV-MCNC: 9 MG/DL (ref 7–18)
CHLORIDE BLD-SCNC: 104 MEQ/L (ref 98–107)
CO2: 25 MEQ/L (ref 21–32)
CREAT SERPL-MCNC: 0.8 MG/DL (ref 0.6–1.3)
EKG ATRIAL RATE: 109 BPM
EKG P AXIS: 58 DEGREES
EKG P-R INTERVAL: 154 MS
EKG Q-T INTERVAL: 352 MS
EKG QRS DURATION: 98 MS
EKG QTC CALCULATION (BAZETT): 474 MS
EKG R AXIS: -20 DEGREES
EKG T AXIS: 21 DEGREES
EKG VENTRICULAR RATE: 109 BPM
EOSINOPHILS RELATIVE PERCENT: 4.5 % (ref 0–4)
GFR, ESTIMATED: 81 ML/MIN/1.73M2
GLUCOSE BLD-MCNC: 202 MG/DL (ref 74–106)
HCT VFR BLD CALC: 41.2 % (ref 37–47)
HEMOGLOBIN: 13.4 GM/DL (ref 12–16)
LYMPHOCYTES # BLD: 21.5 % (ref 15–47)
MCH RBC QN AUTO: 29 PG (ref 27–31)
MCHC RBC AUTO-ENTMCNC: 32.5 GM/DL (ref 33–37)
MCV RBC AUTO: 89.3 FL (ref 81–99)
MONOCYTES: 5.5 % (ref 0–12)
NT PRO BNP: 18 PG/ML (ref 0–450)
PDW BLD-RTO: 13.5 % (ref 11.5–14.5)
PLATELET # BLD: 290 THOU/MM3 (ref 130–400)
PMV BLD AUTO: 7.8 FL (ref 7.4–10.4)
POC CALCIUM: 8.9 MG/DL (ref 8.5–10.1)
POTASSIUM SERPL-SCNC: 4 MEQ/L (ref 3.5–5.1)
RBC # BLD: 4.61 MILL/MM3 (ref 4.2–5.4)
SEGS: 68.2 % (ref 43–75)
SODIUM BLD-SCNC: 138 MEQ/L (ref 136–145)
TOTAL PROTEIN: 7.3 GM/DL (ref 6.4–8.2)
TROPONIN I: < 0.017 NG/ML (ref 0.02–0.05)
WBC # BLD: 9.2 THOU/MM3 (ref 4.8–10.8)

## 2019-09-21 PROCEDURE — 85025 COMPLETE CBC W/AUTO DIFF WBC: CPT

## 2019-09-21 PROCEDURE — 71045 X-RAY EXAM CHEST 1 VIEW: CPT

## 2019-09-21 PROCEDURE — 99285 EMERGENCY DEPT VISIT HI MDM: CPT

## 2019-09-21 PROCEDURE — 93005 ELECTROCARDIOGRAM TRACING: CPT | Performed by: FAMILY MEDICINE

## 2019-09-21 PROCEDURE — 80076 HEPATIC FUNCTION PANEL: CPT

## 2019-09-21 PROCEDURE — 36415 COLL VENOUS BLD VENIPUNCTURE: CPT

## 2019-09-21 PROCEDURE — 83880 ASSAY OF NATRIURETIC PEPTIDE: CPT

## 2019-09-21 PROCEDURE — 80048 BASIC METABOLIC PNL TOTAL CA: CPT

## 2019-09-21 PROCEDURE — 84443 ASSAY THYROID STIM HORMONE: CPT

## 2019-09-21 PROCEDURE — 84484 ASSAY OF TROPONIN QUANT: CPT

## 2019-09-21 PROCEDURE — 2709999900 HC NON-CHARGEABLE SUPPLY

## 2019-09-21 ASSESSMENT — ENCOUNTER SYMPTOMS
EYES NEGATIVE: 1
ALLERGIC/IMMUNOLOGIC NEGATIVE: 1
GASTROINTESTINAL NEGATIVE: 1
RESPIRATORY NEGATIVE: 1

## 2019-09-21 NOTE — ED NOTES
Doctor Lianet Bowman at bedside discussing results with patient and family.      Rain Connelly RN  09/21/19 1985

## 2019-09-21 NOTE — ED NOTES
Observed patient sitting on the side of bed, resp easy, pink, warm and dry, pain denied. Patient stable discharge instructions given. Patient educated on follow up, signs and symptoms to monitor, rest. Patient verbalized understanding. Denied questions. Observed patient steadily ambulate from the department with significant after discharge.       Darleen Newton RN  09/21/19 2004

## 2019-09-22 LAB — TSH SERPL DL<=0.05 MIU/L-ACNC: 1.55 UIU/ML (ref 0.4–4.2)

## 2019-11-01 ENCOUNTER — HOSPITAL ENCOUNTER (OUTPATIENT)
Age: 49
Discharge: HOME | End: 2019-11-01
Payer: COMMERCIAL

## 2019-11-01 LAB
AVERAGE GLUCOSE: 126 MG/DL (ref 70–126)
CHOLESTEROL, FASTING: 165 MG/DL (ref 100–199)
HBA1C MFR BLD: 6.2 % (ref 4.4–6.4)
HDLC SERPL-MCNC: 65 MG/DL
LDL CHOLESTEROL CALCULATED: 78 MG/DL
TRIGLYCERIDE, FASTING: 108 MG/DL (ref 0–199)

## 2019-11-01 PROCEDURE — 80061 LIPID PANEL: CPT

## 2019-11-01 PROCEDURE — 36415 COLL VENOUS BLD VENIPUNCTURE: CPT

## 2019-11-01 PROCEDURE — 83036 HEMOGLOBIN GLYCOSYLATED A1C: CPT

## 2019-11-26 ENCOUNTER — HOSPITAL ENCOUNTER (OUTPATIENT)
Age: 49
Discharge: HOME | End: 2019-11-26
Payer: COMMERCIAL

## 2019-11-26 LAB
T4 FREE: 1.77 NG/DL (ref 0.93–1.76)
TSH SERPL DL<=0.05 MIU/L-ACNC: 0.7 UIU/ML (ref 0.4–4.2)

## 2019-11-26 PROCEDURE — 84439 ASSAY OF FREE THYROXINE: CPT

## 2019-11-26 PROCEDURE — 36415 COLL VENOUS BLD VENIPUNCTURE: CPT

## 2019-11-26 PROCEDURE — 84443 ASSAY THYROID STIM HORMONE: CPT

## 2020-01-06 ENCOUNTER — HOSPITAL ENCOUNTER (OUTPATIENT)
Dept: WOMENS IMAGING | Age: 50
Discharge: HOME | End: 2020-01-06
Payer: COMMERCIAL

## 2020-01-06 ENCOUNTER — NURSE ONLY (OUTPATIENT)
Dept: LAB | Age: 50
End: 2020-01-06

## 2020-01-06 LAB — TSH SERPL DL<=0.05 MIU/L-ACNC: 0.88 UIU/ML (ref 0.4–4.2)

## 2020-01-06 PROCEDURE — 77063 BREAST TOMOSYNTHESIS BI: CPT

## 2020-01-09 ENCOUNTER — HOSPITAL ENCOUNTER (OUTPATIENT)
Dept: WOMENS IMAGING | Age: 50
Discharge: HOME | End: 2020-01-09
Payer: COMMERCIAL

## 2020-01-09 PROCEDURE — G0279 TOMOSYNTHESIS, MAMMO: HCPCS

## 2020-01-09 PROCEDURE — 76642 ULTRASOUND BREAST LIMITED: CPT

## 2020-10-22 ENCOUNTER — HOSPITAL ENCOUNTER (OUTPATIENT)
Age: 50
Discharge: HOME | End: 2020-10-22

## 2020-10-22 PROCEDURE — 36415 COLL VENOUS BLD VENIPUNCTURE: CPT

## 2020-10-22 PROCEDURE — 86787 VARICELLA-ZOSTER ANTIBODY: CPT

## 2020-10-25 LAB — MISC. #1 REFERENCE GROUP TEST: NORMAL

## 2020-12-23 ENCOUNTER — NURSE ONLY (OUTPATIENT)
Dept: LAB | Age: 50
End: 2020-12-23

## 2020-12-23 LAB
ALBUMIN SERPL-MCNC: 3.8 G/DL (ref 3.5–5.1)
ALP BLD-CCNC: 60 U/L (ref 38–126)
ALT SERPL-CCNC: 17 U/L (ref 11–66)
ANION GAP SERPL CALCULATED.3IONS-SCNC: 9 MEQ/L (ref 8–16)
AST SERPL-CCNC: 13 U/L (ref 5–40)
AVERAGE GLUCOSE: 141 MG/DL (ref 70–126)
BASOPHILS # BLD: 1.2 %
BASOPHILS ABSOLUTE: 0.1 THOU/MM3 (ref 0–0.1)
BILIRUB SERPL-MCNC: < 0.2 MG/DL (ref 0.3–1.2)
BILIRUBIN DIRECT: < 0.2 MG/DL (ref 0–0.3)
BUN BLDV-MCNC: 12 MG/DL (ref 7–22)
CALCIUM SERPL-MCNC: 9.3 MG/DL (ref 8.5–10.5)
CHLORIDE BLD-SCNC: 104 MEQ/L (ref 98–111)
CHOLESTEROL, TOTAL: 169 MG/DL (ref 100–199)
CO2: 24 MEQ/L (ref 23–33)
CREAT SERPL-MCNC: 0.7 MG/DL (ref 0.4–1.2)
EOSINOPHIL # BLD: 3.3 %
EOSINOPHILS ABSOLUTE: 0.2 THOU/MM3 (ref 0–0.4)
ERYTHROCYTE [DISTWIDTH] IN BLOOD BY AUTOMATED COUNT: 13.1 % (ref 11.5–14.5)
ERYTHROCYTE [DISTWIDTH] IN BLOOD BY AUTOMATED COUNT: 43.5 FL (ref 35–45)
GFR SERPL CREATININE-BSD FRML MDRD: 88 ML/MIN/1.73M2
GLUCOSE BLD-MCNC: 139 MG/DL (ref 70–108)
HBA1C MFR BLD: 6.7 % (ref 4.4–6.4)
HCT VFR BLD CALC: 40.3 % (ref 37–47)
HDLC SERPL-MCNC: 56 MG/DL
HEMOGLOBIN: 12.5 GM/DL (ref 12–16)
IMMATURE GRANS (ABS): 0.01 THOU/MM3 (ref 0–0.07)
IMMATURE GRANULOCYTES: 0.2 %
LDL CHOLESTEROL CALCULATED: 98 MG/DL
LYMPHOCYTES # BLD: 27.8 %
LYMPHOCYTES ABSOLUTE: 1.7 THOU/MM3 (ref 1–4.8)
MCH RBC QN AUTO: 28.3 PG (ref 26–33)
MCHC RBC AUTO-ENTMCNC: 31 GM/DL (ref 32.2–35.5)
MCV RBC AUTO: 91.2 FL (ref 81–99)
MONOCYTES # BLD: 6.3 %
MONOCYTES ABSOLUTE: 0.4 THOU/MM3 (ref 0.4–1.3)
NUCLEATED RED BLOOD CELLS: 0 /100 WBC
PLATELET # BLD: 285 THOU/MM3 (ref 130–400)
PMV BLD AUTO: 10.3 FL (ref 9.4–12.4)
POTASSIUM SERPL-SCNC: 4.2 MEQ/L (ref 3.5–5.2)
RBC # BLD: 4.42 MILL/MM3 (ref 4.2–5.4)
SEG NEUTROPHILS: 61.2 %
SEGMENTED NEUTROPHILS ABSOLUTE COUNT: 3.7 THOU/MM3 (ref 1.8–7.7)
SODIUM BLD-SCNC: 137 MEQ/L (ref 135–145)
TOTAL PROTEIN: 6.6 G/DL (ref 6.1–8)
TRIGL SERPL-MCNC: 75 MG/DL (ref 0–199)
TSH SERPL DL<=0.05 MIU/L-ACNC: 1.56 UIU/ML (ref 0.4–4.2)
WBC # BLD: 6.1 THOU/MM3 (ref 4.8–10.8)

## 2020-12-24 LAB — MICROALBUMIN UR-MCNC: NORMAL

## 2021-02-22 ENCOUNTER — CLINICAL DOCUMENTATION (OUTPATIENT)
Dept: PHARMACY | Facility: CLINIC | Age: 51
End: 2021-02-22

## 2021-02-22 PROBLEM — E11.9 TYPE 2 DIABETES MELLITUS WITHOUT COMPLICATION (HCC): Status: RESOLVED | Noted: 2017-02-27 | Resolved: 2021-02-22

## 2021-02-22 NOTE — LETTER
[place] [address]  [address], [place] [address] [address]  Phone: toll free [phone] [address] [place]  [address] [facility] [address]           03/16/21     Dear Oscar Wakefield! You have successfully enrolled in the Be Well With Diabetes program for 2021. What you receive  Beginning April 1, you will begin receiving up to $600 in waived copays for specific medications and pharmacy-related supplies purchased through our home delivery pharmacy, [facility]. A list of eligible medications and pharmacy supplies can be found at [employer] under Be Well With Diabetes. In addition, youll receive advice and help from our pharmacists, associate care management team and diabetes educators. (And, if you also participate in the Be Well program, you can earn points and Lifestyle Management or Health Management program credit, if applicable.)    What you need to do  To maintain your benefit this year and remain eligible next year, complete the following requirements:      *Can be satisfied through [employer] Screening on-site. **Requirements to enroll must be completed within 6 months of enrollment date **Pneumonia vaccine is dependent on previous immunization and your age. Remember, program requirements must be completed by deadlines shown. If not, your benefit may be terminated, and you will not be eligible to participate again until the following year. To keep you on track, well review your [employer] account and send reminders for action. (If you dont have a [address], submit documentation to [email or web address] or by fax to [phone].)    Congratulations and thank you for taking steps to improve your health and to Be Well With Diabetes. [address] [facility]  Phone: [phone], option [address]  Email: [email or web address] [email or web address]  Fax: [phone]

## 2021-03-16 ENCOUNTER — CLINICAL DOCUMENTATION (OUTPATIENT)
Dept: PHARMACY | Facility: CLINIC | Age: 51
End: 2021-03-16

## 2021-03-16 ENCOUNTER — HOSPITAL ENCOUNTER (OUTPATIENT)
Age: 51
Discharge: HOME | End: 2021-03-16
Payer: COMMERCIAL

## 2021-03-16 LAB
AVERAGE GLUCOSE: 138 MG/DL (ref 70–126)
HBA1C MFR BLD: 6.6 % (ref 4.4–6.4)
TSH SERPL DL<=0.05 MIU/L-ACNC: 0.68 UIU/ML (ref 0.4–4.2)

## 2021-03-16 PROCEDURE — 36415 COLL VENOUS BLD VENIPUNCTURE: CPT

## 2021-03-16 PROCEDURE — 84443 ASSAY THYROID STIM HORMONE: CPT

## 2021-03-16 PROCEDURE — 83036 HEMOGLOBIN GLYCOSYLATED A1C: CPT

## 2021-03-16 NOTE — PROGRESS NOTES
Per Health Provider Diabetes Screening Form:  Patient had a DM OV with Khadra Larios CNP on 12/03/20.

## 2021-03-16 NOTE — PROGRESS NOTES
Received DM Program Application and Health Provider Diabetes Screening Form. Pharmacy Pop Care Documentation:   Patient has completed all the requirements by 3/25/21 and therefore will be enrolled in the DM Program on 04/01/21. Letter and e-mail sent to patient.     Chelsi Ann, Via [employer] [facility]   Department, toll free: [phone], option 7

## 2021-04-01 ENCOUNTER — TELEPHONE (OUTPATIENT)
Dept: PHARMACY | Facility: CLINIC | Age: 51
End: 2021-04-01

## 2021-04-01 ENCOUNTER — CLINICAL DOCUMENTATION (OUTPATIENT)
Dept: PHARMACY | Facility: CLINIC | Age: 51
End: 2021-04-01

## 2021-04-01 NOTE — TELEPHONE ENCOUNTER
Called patient to schedule 2021 yearly pharmacist appointment to discuss medications for Diabetes Management Program.    No answer. Left VM on home/cell TAD: Please call back at [phone] Option [address]7.      Ludwig Martel, Via [employer]   Department, toll free: [phone], option 7

## 2021-04-01 NOTE — PROGRESS NOTES
Pharmacy Pop Care Documentation:      The application for Brandon Mckoy for enrollment into the diabetes management program has been reviewed and accepted on 4/1/21.     Sharmaine Powell

## 2021-04-02 NOTE — TELEPHONE ENCOUNTER
Patient called back to schedule yearly pharmacist appointment to discuss medications for Diabetes Management Program.     Spoke to patient and appointment scheduled for 4/6/21 at 4:00pm.       [employer] [facility], [address]   Department, toll free: [phone], option 7

## 2021-04-06 ENCOUNTER — SCHEDULED TELEPHONE ENCOUNTER (OUTPATIENT)
Dept: PHARMACY | Facility: CLINIC | Age: 51
End: 2021-04-06

## 2021-04-06 NOTE — LETTER
[address] REFILL REQUEST  Prescriber:  Connie Church APRN - CNP  [facility] [facility] [address]  Phone: [phone]       Fax: [phone]     Patient:  Larna Sacks    1970  [address]  [phone] (home)      Rationale:   Patient was interested in a new glucometer. She requested the following brand. She is able to get for no charge at her mail order pharmacy. Patient would like the following 90 day prescription(s):    [employer]atrix [place] Blood glucose meter, strips and lancets       Prescriber Response:    Prescription(s) approved (please [place] one):  YES  NO    Other response: ________________________________________      ______________________________________   __________________  Authorized By       Date     Pharmacy: Dylan Goldstein                         ESCUV:[phone]    Ezio Whitten, [address], [address] [place] [address]      The information transmitted is intended only for the person or entity to which it is addressed and may contain confidential and/or privileged material. Any review, retransmission, dissemination or other use of, or taking of any action in reliance upon, this information by persons or entities other than the intended recipient is prohibited. This document contains information covered under the Privacy Act, 5 (a), and/or the [employer] and Accountability Act ([address]) and its various implementing regulations and must be protected in accordance with those provisions. If you received this in error, please contact the sender and delete the material from any computer.

## 2021-04-06 NOTE — TELEPHONE ENCOUNTER
[facility] CLINICAL PHARMACY REVIEW - Be Well with Diabetes    Shay Ng is a 48 y.o. female enrolled in the [address] [place], [place] [address] Employee Diabetes Program. Patient provided Geovanny Lacey with verbal consent to remain in the program for this year. Patient enrolled 4/1/21. Medications:  Current Outpatient Medications   Medication Sig Dispense Refill    metFORMIN (GLUCOPHAGE) 1000 MG tablet Take 1,000 mg by mouth 2 times daily (with meals)      losartan (COZAAR) 50 MG tablet Take 50 mg by mouth daily      levothyroxine (SYNTHROID) 150 MCG tablet Take 150 mcg by mouth Daily Indications: take a 1/2 pill on Sunday only       Probiotic Product (PROBIOTIC ADVANCED PO) Take by mouth      escitalopram (LEXAPRO) 10 MG tablet Take 10 mg by mouth See Admin Instructions. 1/2 TABLET DAILY.  MULTIPLE VITAMIN PO Take  by mouth daily.  Cyanocobalamin (VITAMIN B 12 PO) Take  by mouth daily.  Cholecalciferol (VITAMIN D PO) Take  by mouth daily. No current facility-administered medications for this visit. Current Pharmacy: [facility]  Current testing supplies/frequency: Using [employer] brand- Requested [employer] [facility]   Pen needles/syringes: n/a    Allergies:   Allergies   Allergen Reactions    Cortizone-10 [Hydrocortisone]     Decongestant [Albertsons Di Bromm]     Red Dye       Vitals/Labs:  BP Readings from Last 3 Encounters:   09/21/19 131/67   06/20/19 136/76   05/06/19 138/78     Lab Results   Component Value Date    LABMICR Normal 12/24/2020     Lab Results   Component Value Date    LABA1C 6.6 (H) 03/16/2021    LABA1C 6.7 (H) 12/23/2020    LABA1C 6.2 11/01/2019     Lab Results   Component Value Date    CHOL 169 12/23/2020    TRIG 75 12/23/2020    HDL 56 12/23/2020    LDLCALC 98 12/23/2020     ALT   Date Value Ref Range Status   12/23/2020 17 11 - 66 U/L Final     AST   Date Value Ref Range Status   12/23/2020 13 5 - 40 U/L Final     The ASCVD Risk score (Ishmael Esparza et al., 2013) failed to calculate for the following reasons: The systolic blood pressure is missing     Lab Results   Component Value Date    CREATININE 0.7 12/23/2020     CrCl cannot be calculated (Unknown ideal weight.). Lab Results   Component Value Date    LABGLOM 88 12/23/2020    LABGLOM 89 08/27/2018    LABGLOM 90 02/16/2017       Immunizations: There is no immunization history on file for this patient. Social History:  Social History     Tobacco Use    Smoking status: Never Smoker    Smokeless tobacco: Never Used   Substance Use Topics    Alcohol use: Yes     Comment: social      ASSESSMENT:  Initial Program Requirements (Y indicates has completed for the year, N indicates needs to be completed by 07/01/2021): No - Provider Visit for DM (1st)- Patient states she had an appt and will be sending us documentation  Yes - A1c (1st)     Ongoing Program Requirements (Y indicates has completed for the year, N indicates needs to be completed by 12/31/2021): No - Provider Visit for DM (2nd)  Yes - ACC/diabetes educator visit (if A1c over 8%)  No - A1c (2nd)  No - Lipid panel  No - Urine microalbumin  No - Pneumococcal vaccination: [id number]  Yes - Influenza vaccination for Fall 2021  Yes - Medication adherence over 70%  No - On statin or contraindication(s) Not identified- Pt stated that she has discussed with her provider and they have decided not to start. Informed her that we will need documentation of this. Yes - On ACEi/ARB or contraindication(s) Losartan 50mg     Current medications eligible for copay waiver, up to $600, through [employer]:  - Metformin, Losartan, Levothyroxine  - Farheenmajose Cruz     Diabetes Care:   - Glycemic Goal: <7.0%. Stable and at blood glucose goal. Type 2 DM under excellent control as evidenced by A1c of 6.6%. - Home blood sugar records: Patient tests a few times per week.  Did not discuss specific readings, but she did mention that she has seen a few \"really high\" numbers in the last few weeks (200-300). - Therapy Optimization: Pt recently increased to Metformin 1000mg BID. - Other therapies tried: Pioglitazone- swelling, SGLT2i- Yeast infections  - Medication compliance: unable to assess due to little/no refill history. Pt is new to ins.  - Diet compliance: compliant all of the time- reports that she has lost 10 lbs over the last few weeks. Other Considerations:  - Blood Pressure Goal: BP less than 140/90 mmHg due to history of DM: Unable to assess if at BP target. - Lipids: Patient is not prescribed moderate-intensity statin therapy. PLAN:  - Consideration(s) for provider:   · Request for [employer] faxed to pcp  - Patient to provide us with OV documentation and statin override.  - Sent patient [facility] email address to request dietician  - DM program gaps identified:   · Initial requirements: Provider Visit for DM (1st)   · Ongoing requirements: Provider visit for DM (2nd), A1c (2nd), On statin or contraindication(s) Not identified, Lipid panel, Urine microalbumin, Pneumococcal vaccination: [id number] and Medication adherence over 70%   - Education to patient: Discussed general issues about diabetes pathophysiology and management., Addressed medication adherence, Overview of Be Well With Diabetes program, Benefit/indication for statin in patients with diabetes, Benefit/indication for pneumonia vaccine in patients with diabetes and Benefit/indication for ACE/ARB in patients with diabetes   - Follow up: PCP for identified gaps or as scheduled below and Diabetes Educator or Ambulatory Care Coordinator for identified gaps or as scheduled below  - Upcoming appointments:   Future Appointments   Date Time Provider Azael Buckley   4/6/2021  4:00 PM SCHEDULE, S CLINICAL PHARMACY S Clin Rx None       KAREN Gurrola, PharmD, [address] [facility]  Direct: [phone]  Department, toll free: [phone], option 7

## 2021-05-06 ENCOUNTER — HOSPITAL ENCOUNTER (OUTPATIENT)
Age: 51
Discharge: HOME | End: 2021-05-06
Payer: COMMERCIAL

## 2021-05-06 LAB — TSH SERPL DL<=0.05 MIU/L-ACNC: 0.45 UIU/ML (ref 0.4–4.2)

## 2021-05-06 PROCEDURE — 36415 COLL VENOUS BLD VENIPUNCTURE: CPT

## 2021-05-06 PROCEDURE — 84443 ASSAY THYROID STIM HORMONE: CPT

## 2021-06-07 ENCOUNTER — TELEPHONE (OUTPATIENT)
Dept: PHARMACY | Facility: CLINIC | Age: 51
End: 2021-06-07

## 2021-06-07 NOTE — TELEPHONE ENCOUNTER
Pharmacy Pop Care Documentation:   Called patient with reminder for requirements for Diabetes Management Program.     According to our records, patient is missing the following requirement(s) that must be completed by July 1st 2021:   · 1st 2021 Provider Visit for DM      Patient not available at the time of call. Left message on mobile Apartment List with the above information. Cogn[employer] message sent.         Casi Low, [address]   Department, toll free: [phone], option 7

## 2021-06-08 NOTE — TELEPHONE ENCOUNTER
Patient called in and stated she fax in her paper for A1c and screening and was told that would be good enough. She said she had her first visit right before the first of the year. Can we use the information that was already sent to us? On the form at the bottom, the provider wrote in the date of her appointment and test results. Federica Landaverde stated her provider just went live on Epic 2 months ago.     I told her we'll give her a call back once we get an update

## 2021-06-09 NOTE — TELEPHONE ENCOUNTER
Called patient back to explain that a 2021 documentation of DM OV is still needed. Apologized for the confusion and gave her fax number for provider to send documentation.      Anabel Mclaughlin, [address]   Department, toll free: [phone], option 7

## 2021-06-16 LAB
CHOLESTEROL, TOTAL: 177 MG/DL (ref 0–199)
FASTING: YES
GLUCOSE BLD-MCNC: 108 MG/DL (ref 74–109)
HDLC SERPL-MCNC: 67 MG/DL (ref 40–90)
LDL CHOLESTEROL CALCULATED: 88 MG/DL
TRIGL SERPL-MCNC: 109 MG/DL (ref 0–199)

## 2021-07-07 ENCOUNTER — CLINICAL DOCUMENTATION (OUTPATIENT)
Dept: PHARMACY | Facility: CLINIC | Age: 51
End: 2021-07-07

## 2021-07-07 NOTE — PROGRESS NOTES
Pharmacy Pop Care Documentation:     AVS received for required office visit on: 3/15/21: Legrand Gowers, CNP

## 2021-07-20 ENCOUNTER — HOSPITAL ENCOUNTER (OUTPATIENT)
Age: 51
Discharge: HOME | End: 2021-07-20
Payer: COMMERCIAL

## 2021-07-20 LAB — TSH SERPL DL<=0.05 MIU/L-ACNC: 0.41 UIU/ML (ref 0.4–4.2)

## 2021-07-20 PROCEDURE — 84432 ASSAY OF THYROGLOBULIN: CPT

## 2021-07-20 PROCEDURE — 36415 COLL VENOUS BLD VENIPUNCTURE: CPT

## 2021-07-20 PROCEDURE — 84443 ASSAY THYROID STIM HORMONE: CPT

## 2021-07-20 PROCEDURE — 86800 THYROGLOBULIN ANTIBODY: CPT

## 2021-07-29 LAB — THYROGLOBULIN: < 0.5 NG/ML (ref 1.3–31.8)

## 2021-08-03 ENCOUNTER — NURSE ONLY (OUTPATIENT)
Dept: LAB | Age: 51
End: 2021-08-03

## 2021-08-13 ENCOUNTER — TELEPHONE (OUTPATIENT)
Dept: PHARMACY | Facility: CLINIC | Age: 51
End: 2021-08-13

## 2021-08-13 NOTE — TELEPHONE ENCOUNTER
[facility] CLINICAL PHARMACY REVIEW - BE WELL WITH DIABETES: Statin, ACE/ARB Gaps  =================================================================  Davey Smith is a 46 y.o. female enrolled in the [address] [place] [address] Diabetes Program.      Identified care gap: Patient with diabetes not currently filling Statin     Pertinent medications  Current Outpatient Medications   Medication Sig Dispense Refill    escitalopram (LEXAPRO) 10 MG tablet TAKE 1 TABLET BY MOUTH ONCE DAILY 30 tablet 3    metFORMIN (GLUCOPHAGE-XR) 500 MG extended release tablet TAKE TWO TABLETS BY MOUTH TWICE A  tablet 6    VITAMIN D, CHOLECALCIFEROL, PO Take 2,000 Units by mouth daily      Cyanocobalamin (VITAMIN B 12 PO) Take 1 tablet by mouth daily      losartan (COZAAR) 50 MG tablet Take 50 mg by mouth daily      Multiple Vitamin (MULTIVITAMIN) capsule Take 1 capsule by mouth daily      levothyroxine (SYNTHROID) 150 MCG tablet Take 150 mcg by mouth Daily Indications: take a 1/2 pill on Sunday only       Probiotic Product (PROBIOTIC ADVANCED PO) Take by mouth       No current facility-administered medications for this visit. Pertinent Labs/Vitals:  Lab Results   Component Value Date    LABA1C 6.6 (H) 03/16/2021    LABA1C 6.7 (H) 12/23/2020    LABA1C 6.2 11/01/2019       STATIN:  Lab Results   Component Value Date    LDLCALC 88 06/16/2021    LDLCALC 98 12/23/2020    [address] 11/01/2019     Lab Results   Component Value Date    ALT 17 12/23/2020        The ASCVD Risk score (Bhavesh Alfred. et al., 2013) failed to calculate for the following reasons: The systolic blood pressure is missing    ACE/ARB:  BP Readings from Last 3 Encounters:   09/21/19 131/67   06/20/19 136/76   05/06/19 138/78      Lab Results   Component Value Date    CREATININE 0.7 12/23/2020      CrCl cannot be calculated (Patient's most recent lab result is older than the maximum 120 days allowed. ).    Lab Results   Component Value Date    LABMICR Normal 12/24/2020       Assessment/Outcomes:  No - On statin or contraindication(s) Not identified - [facility] visit from 2021 states patient would provide documentation from provider that provider disagreed at this time. No documentation has been provided. BP from OV provided from March 2021- 140/84    Calculated ASCVD risk: 2.9%, Moderate-intensity statin recommended because of known diabetes and 10-year risk <7.5%. Will fax provider.      Kelley Meyers, PharmD, [address] Pharmacist  Department: [phone]  ===========================================  For Pharmacy Admin Tracking Only     CPA in place:  No   Recommendation Provided To: Provider: 1 via Fax sent to office   Intervention Detail: New Rx: 1, reason: Needs Additional Therapy   Gap Closed?: No    Intervention Accepted By: Provider: 0   Time Spent (min): 10

## 2021-08-13 NOTE — LETTER
Using a Statin for Your Patient with Diabetes    Date: 21    Dear CRISTY Mercedes CNP,  Fax: [phone]      Concerning patient: Pilar Kaba  :  1970      It is strongly recommended that your patient with diabetes be on a statin regimen. The American Diabetes Association Standards of Medical Care guidelines that suggest a statin for most diabetes patients age 36 to 76. These recommendations are based on primary and secondary prevention benefits of statins on cardiovascular events and mortality in this patient population. A moderate-intensity statin (e.g., atorvastatin 20 mg) is recommended for patients in this age group without additional cardiovascular risk factors. Consider adding a moderate intensity statin to your patients regimen for the following reason: Calculated ASCVD risk: 2.9%, Moderate-intensity statin recommended because of known diabetes and 10-year risk <7.5%. If you agree, send a prescription to your patients pharmacy. If you do not agree, please return response to fax number below due to requirement for patient to continue enrollment in diabetic program.     Please also feel free to contact me at the number below with any questions or concerns, or if you would like me to further discuss with your patient. Thank you for your help and consideration in caring for this patient.     Respectfully,    Maryan Bowman [facility],  [facility]  Phone: [phone]  Fax: [phone]

## 2021-08-20 ENCOUNTER — NURSE ONLY (OUTPATIENT)
Dept: LAB | Age: 51
End: 2021-08-20

## 2021-10-02 ENCOUNTER — HOSPITAL ENCOUNTER (OUTPATIENT)
Dept: LAB | Age: 51
Discharge: HOME | End: 2021-10-02
Payer: COMMERCIAL

## 2021-10-02 ENCOUNTER — HOSPITAL ENCOUNTER (OUTPATIENT)
Age: 51
End: 2021-10-02
Payer: COMMERCIAL

## 2021-10-02 DIAGNOSIS — E11.9 TYPE 2 DIABETES MELLITUS WITHOUT COMPLICATION, WITHOUT LONG-TERM CURRENT USE OF INSULIN (HCC): ICD-10-CM

## 2021-10-02 PROCEDURE — 36415 COLL VENOUS BLD VENIPUNCTURE: CPT

## 2021-10-02 PROCEDURE — 83036 HEMOGLOBIN GLYCOSYLATED A1C: CPT

## 2021-10-03 LAB
ESTIMATED AVERAGE GLUCOSE: 123 MG/DL
HBA1C MFR BLD: 5.9 % (ref 4–6)

## 2021-12-23 ENCOUNTER — HOSPITAL ENCOUNTER (OUTPATIENT)
Dept: LAB | Age: 51
Discharge: HOME | End: 2021-12-23
Payer: COMMERCIAL

## 2021-12-23 LAB
THYROXINE, FREE: 1.61 NG/DL (ref 0.93–1.7)
TSH SERPL DL<=0.05 MIU/L-ACNC: 0.53 MIU/L (ref 0.3–5)

## 2021-12-23 PROCEDURE — 84439 ASSAY OF FREE THYROXINE: CPT

## 2021-12-23 PROCEDURE — 84443 ASSAY THYROID STIM HORMONE: CPT

## 2021-12-23 PROCEDURE — 36415 COLL VENOUS BLD VENIPUNCTURE: CPT

## 2022-01-04 ENCOUNTER — TELEPHONE (OUTPATIENT)
Dept: PHARMACY | Facility: CLINIC | Age: 52
End: 2022-01-04

## 2022-01-04 NOTE — TELEPHONE ENCOUNTER
Patient returned call. Patient is no longer a [facility] employee/does not have our benefits.     For Remi Wilcox in place:  No   Recommendation Provided To: Patient/Caregiver: 1 via Telephone   Gap Closed?: No    Intervention Accepted By: Patient/Caregiver: 0   Time Spent (min): 15

## 2022-01-26 ENCOUNTER — CLINICAL DOCUMENTATION (OUTPATIENT)
Dept: PHARMACY | Facility: CLINIC | Age: 52
End: 2022-01-26

## 2022-01-26 NOTE — PROGRESS NOTES
Pharmacy Pop Care Documentation:     Romualdo Cabot is being removed from the diabetes management program for the following reason(s): Loss of [address] [facility] benefits    Rosa Bruno

## 2022-04-30 ENCOUNTER — HOSPITAL ENCOUNTER (OUTPATIENT)
Dept: LAB | Age: 52
Discharge: HOME | End: 2022-04-30
Payer: COMMERCIAL

## 2022-04-30 LAB
THYROXINE, FREE: 1.66 NG/DL (ref 0.93–1.7)
TSH SERPL DL<=0.05 MIU/L-ACNC: 1.01 UIU/ML (ref 0.3–5)

## 2022-04-30 PROCEDURE — 84443 ASSAY THYROID STIM HORMONE: CPT

## 2022-04-30 PROCEDURE — 86800 THYROGLOBULIN ANTIBODY: CPT

## 2022-04-30 PROCEDURE — 84439 ASSAY OF FREE THYROXINE: CPT

## 2022-04-30 PROCEDURE — 84432 ASSAY OF THYROGLOBULIN: CPT

## 2022-04-30 PROCEDURE — 36415 COLL VENOUS BLD VENIPUNCTURE: CPT

## 2022-05-02 LAB
THYROGLOBULIN AB: 78 IU/ML (ref 0–40)
THYROGLOBULIN: <0.2 NG/ML (ref 0–63.4)

## 2022-06-06 PROBLEM — I10 PRIMARY HYPERTENSION: Status: ACTIVE | Noted: 2022-06-06

## 2022-06-06 PROBLEM — F32.A DEPRESSION: Status: ACTIVE | Noted: 2022-06-06

## 2022-06-06 PROBLEM — E03.9 HYPOTHYROIDISM: Status: ACTIVE | Noted: 2022-06-06

## 2022-09-21 ENCOUNTER — HOSPITAL ENCOUNTER (OUTPATIENT)
Dept: LAB | Age: 52
Discharge: HOME | End: 2022-09-21
Payer: COMMERCIAL

## 2022-09-21 LAB — TSH SERPL DL<=0.05 MIU/L-ACNC: 3.09 UIU/ML (ref 0.3–5)

## 2022-09-21 PROCEDURE — 36415 COLL VENOUS BLD VENIPUNCTURE: CPT

## 2022-09-21 PROCEDURE — 86800 THYROGLOBULIN ANTIBODY: CPT

## 2022-09-21 PROCEDURE — 84443 ASSAY THYROID STIM HORMONE: CPT

## 2022-09-21 PROCEDURE — 84432 ASSAY OF THYROGLOBULIN: CPT

## 2022-09-22 LAB
THYROGLOBULIN AB: 58 IU/ML (ref 0–40)
THYROGLOBULIN: <0.2 NG/ML (ref 0–63.4)

## 2022-12-08 ENCOUNTER — HOSPITAL ENCOUNTER (OUTPATIENT)
Age: 52
Discharge: HOME | End: 2022-12-08
Payer: COMMERCIAL

## 2022-12-08 LAB — TSH SERPL DL<=0.05 MIU/L-ACNC: 0.98 UIU/ML (ref 0.3–5)

## 2022-12-08 PROCEDURE — 84443 ASSAY THYROID STIM HORMONE: CPT

## 2022-12-08 PROCEDURE — 36415 COLL VENOUS BLD VENIPUNCTURE: CPT

## 2023-01-23 ENCOUNTER — HOSPITAL ENCOUNTER (OUTPATIENT)
Age: 53
Discharge: HOME | End: 2023-01-23
Payer: COMMERCIAL

## 2023-01-23 LAB — TSH SERPL DL<=0.05 MIU/L-ACNC: 0.31 UIU/ML (ref 0.3–5)

## 2023-01-23 PROCEDURE — 36415 COLL VENOUS BLD VENIPUNCTURE: CPT

## 2023-01-23 PROCEDURE — 84443 ASSAY THYROID STIM HORMONE: CPT

## 2023-01-23 PROCEDURE — 84432 ASSAY OF THYROGLOBULIN: CPT

## 2023-01-23 PROCEDURE — 86800 THYROGLOBULIN ANTIBODY: CPT

## 2023-01-26 LAB
THYROGLOBULIN AB: 50 IU/ML (ref 0–40)
THYROGLOBULIN: <0.2 NG/ML (ref 0–63.4)

## 2023-04-17 ENCOUNTER — HOSPITAL ENCOUNTER (OUTPATIENT)
Age: 53
Discharge: HOME | End: 2023-04-17
Payer: COMMERCIAL

## 2023-04-17 LAB
T4 FREE SERPL-MCNC: 1.6 NG/DL (ref 0.9–1.7)
TSH SERPL-ACNC: 0.49 UIU/ML (ref 0.3–5)

## 2023-04-17 PROCEDURE — 84439 ASSAY OF FREE THYROXINE: CPT

## 2023-04-17 PROCEDURE — 84443 ASSAY THYROID STIM HORMONE: CPT

## 2023-04-17 PROCEDURE — 86800 THYROGLOBULIN ANTIBODY: CPT

## 2023-04-17 PROCEDURE — 84432 ASSAY OF THYROGLOBULIN: CPT

## 2023-04-17 PROCEDURE — 36415 COLL VENOUS BLD VENIPUNCTURE: CPT

## 2023-04-18 LAB
THYROGLOBULIN AB: 78 IU/ML (ref 0–40)
THYROGLOBULIN: <0.2 NG/ML (ref 0–63.4)

## 2023-10-03 PROBLEM — R92.2 DENSE BREAST TISSUE ON MAMMOGRAM: Status: ACTIVE | Noted: 2023-10-03

## 2023-10-03 PROBLEM — R92.30 DENSE BREAST TISSUE ON MAMMOGRAM: Status: ACTIVE | Noted: 2023-10-03

## 2023-10-07 ENCOUNTER — HOSPITAL ENCOUNTER (OUTPATIENT)
Age: 53
Discharge: HOME | End: 2023-10-07
Payer: COMMERCIAL

## 2023-10-07 DIAGNOSIS — Z00.00 WELLNESS EXAMINATION: ICD-10-CM

## 2023-10-07 DIAGNOSIS — I10 PRIMARY HYPERTENSION: ICD-10-CM

## 2023-10-07 DIAGNOSIS — E11.9 TYPE 2 DIABETES MELLITUS WITHOUT COMPLICATION, UNSPECIFIED WHETHER LONG TERM INSULIN USE (HCC): ICD-10-CM

## 2023-10-07 DIAGNOSIS — R53.83 OTHER FATIGUE: ICD-10-CM

## 2023-10-07 LAB
25(OH)D3 SERPL-MCNC: 35.7 NG/ML
ALBUMIN SERPL-MCNC: 4.2 G/DL (ref 3.5–5.2)
ALBUMIN/GLOB SERPL: 1.6 {RATIO} (ref 1–2.5)
ALP SERPL-CCNC: 61 U/L (ref 35–104)
ALT SERPL-CCNC: 40 U/L (ref 5–33)
ANION GAP SERPL CALCULATED.3IONS-SCNC: 9 MMOL/L (ref 9–17)
AST SERPL-CCNC: 27 U/L
BASOPHILS # BLD: 0.04 K/UL (ref 0–0.2)
BASOPHILS NFR BLD: 1 % (ref 0–2)
BILIRUB SERPL-MCNC: 0.4 MG/DL (ref 0.3–1.2)
BUN SERPL-MCNC: 10 MG/DL (ref 6–20)
BUN/CREAT SERPL: 17 (ref 9–20)
CALCIUM SERPL-MCNC: 9.3 MG/DL (ref 8.6–10.4)
CHLORIDE SERPL-SCNC: 105 MMOL/L (ref 98–107)
CHOLEST SERPL-MCNC: 181 MG/DL
CHOLESTEROL/HDL RATIO: 3.4
CO2 SERPL-SCNC: 26 MMOL/L (ref 20–31)
CREAT SERPL-MCNC: 0.6 MG/DL (ref 0.5–0.9)
EOSINOPHIL # BLD: 0.11 K/UL (ref 0–0.44)
EOSINOPHILS RELATIVE PERCENT: 3 % (ref 1–4)
ERYTHROCYTE [DISTWIDTH] IN BLOOD BY AUTOMATED COUNT: 11.7 % (ref 11.8–14.4)
FOLATE SERPL-MCNC: 16.2 NG/ML
GFR SERPL CREATININE-BSD FRML MDRD: >60 ML/MIN/1.73M2
GLUCOSE SERPL-MCNC: 129 MG/DL (ref 70–99)
HCT VFR BLD AUTO: 42.4 % (ref 36.3–47.1)
HDLC SERPL-MCNC: 54 MG/DL
HGB BLD-MCNC: 14.1 G/DL (ref 11.9–15.1)
IMM GRANULOCYTES # BLD AUTO: <0.03 K/UL (ref 0–0.3)
IMM GRANULOCYTES NFR BLD: 0 %
IRON SATN MFR SERPL: 34 % (ref 20–55)
IRON SERPL-MCNC: 91 UG/DL (ref 37–145)
LDLC SERPL CALC-MCNC: 109 MG/DL (ref 0–130)
LYMPHOCYTES NFR BLD: 0.98 K/UL (ref 1.1–3.7)
LYMPHOCYTES RELATIVE PERCENT: 27 % (ref 24–43)
MCH RBC QN AUTO: 30.7 PG (ref 25.2–33.5)
MCHC RBC AUTO-ENTMCNC: 33.3 G/DL (ref 25.2–33.5)
MCV RBC AUTO: 92.4 FL (ref 82.6–102.9)
MONOCYTES NFR BLD: 0.27 K/UL (ref 0.1–1.2)
MONOCYTES NFR BLD: 7 % (ref 3–12)
NEUTROPHILS NFR BLD: 62 % (ref 36–65)
NEUTS SEG NFR BLD: 2.27 K/UL (ref 1.5–8.1)
NRBC BLD-RTO: 0 PER 100 WBC
PLATELET # BLD AUTO: 262 K/UL (ref 138–453)
PMV BLD AUTO: 9.2 FL (ref 8.1–13.5)
POTASSIUM SERPL-SCNC: 4.2 MMOL/L (ref 3.7–5.3)
PROT SERPL-MCNC: 6.9 G/DL (ref 6.4–8.3)
RBC # BLD AUTO: 4.59 M/UL (ref 3.95–5.11)
SODIUM SERPL-SCNC: 140 MMOL/L (ref 135–144)
T4 FREE SERPL-MCNC: 2 NG/DL (ref 0.9–1.7)
TIBC SERPL-MCNC: 270 UG/DL (ref 250–450)
TRIGL SERPL-MCNC: 90 MG/DL
TSH SERPL DL<=0.05 MIU/L-ACNC: 0.1 UIU/ML (ref 0.3–5)
UNSATURATED IRON BINDING CAPACITY: 179 UG/DL (ref 112–347)
VIT B12 SERPL-MCNC: 1578 PG/ML (ref 232–1245)
WBC OTHER # BLD: 3.7 K/UL (ref 3.5–11.3)

## 2023-10-07 PROCEDURE — 36415 COLL VENOUS BLD VENIPUNCTURE: CPT

## 2023-10-07 PROCEDURE — 84439 ASSAY OF FREE THYROXINE: CPT

## 2023-10-07 PROCEDURE — 80053 COMPREHEN METABOLIC PANEL: CPT

## 2023-10-07 PROCEDURE — 83550 IRON BINDING TEST: CPT

## 2023-10-07 PROCEDURE — 82607 VITAMIN B-12: CPT

## 2023-10-07 PROCEDURE — 86800 THYROGLOBULIN ANTIBODY: CPT

## 2023-10-07 PROCEDURE — 84443 ASSAY THYROID STIM HORMONE: CPT

## 2023-10-07 PROCEDURE — 82306 VITAMIN D 25 HYDROXY: CPT

## 2023-10-07 PROCEDURE — 80061 LIPID PANEL: CPT

## 2023-10-07 PROCEDURE — 83540 ASSAY OF IRON: CPT

## 2023-10-07 PROCEDURE — 82746 ASSAY OF FOLIC ACID SERUM: CPT

## 2023-10-07 PROCEDURE — 85025 COMPLETE CBC W/AUTO DIFF WBC: CPT

## 2023-10-12 LAB — THYROGLOBULIN AB: 45 IU/ML (ref 0–40)

## 2023-11-07 ENCOUNTER — HOSPITAL ENCOUNTER (OUTPATIENT)
Dept: WOMENS IMAGING | Age: 53
Discharge: HOME | End: 2023-11-07
Payer: COMMERCIAL

## 2023-11-07 VITALS — WEIGHT: 175 LBS | HEIGHT: 63 IN | BODY MASS INDEX: 31.01 KG/M2

## 2023-11-07 DIAGNOSIS — R92.30 DENSE BREAST TISSUE ON MAMMOGRAM, UNSPECIFIED TYPE: ICD-10-CM

## 2023-11-07 DIAGNOSIS — Z12.31 ENCOUNTER FOR SCREENING MAMMOGRAM FOR BREAST CANCER: ICD-10-CM

## 2023-11-07 PROCEDURE — 77063 BREAST TOMOSYNTHESIS BI: CPT

## 2023-11-07 PROCEDURE — 76641 ULTRASOUND BREAST COMPLETE: CPT

## 2024-02-09 ENCOUNTER — HOSPITAL ENCOUNTER (OUTPATIENT)
Age: 54
Discharge: HOME | End: 2024-02-09
Payer: COMMERCIAL

## 2024-02-09 LAB — TSH SERPL DL<=0.05 MIU/L-ACNC: 0.27 UIU/ML (ref 0.3–5)

## 2024-02-09 PROCEDURE — 36415 COLL VENOUS BLD VENIPUNCTURE: CPT

## 2024-02-09 PROCEDURE — 84443 ASSAY THYROID STIM HORMONE: CPT

## 2024-02-11 ENCOUNTER — HOSPITAL ENCOUNTER (OUTPATIENT)
Age: 54
Setting detail: SPECIMEN
Discharge: HOME | End: 2024-02-11
Payer: COMMERCIAL

## 2024-02-11 ENCOUNTER — OFFICE VISIT (OUTPATIENT)
Dept: PRIMARY CARE CLINIC | Age: 54
End: 2024-02-11

## 2024-02-11 VITALS
SYSTOLIC BLOOD PRESSURE: 130 MMHG | OXYGEN SATURATION: 98 % | WEIGHT: 179 LBS | TEMPERATURE: 98.5 F | DIASTOLIC BLOOD PRESSURE: 74 MMHG | BODY MASS INDEX: 31.71 KG/M2 | HEART RATE: 70 BPM

## 2024-02-11 DIAGNOSIS — R35.0 URINE FREQUENCY: Primary | ICD-10-CM

## 2024-02-11 DIAGNOSIS — R35.0 URINE FREQUENCY: ICD-10-CM

## 2024-02-11 LAB
BACTERIA URNS QL MICRO: ABNORMAL
BILIRUB UR QL STRIP: NEGATIVE
CHARACTER UR: ABNORMAL
CLARITY UR: CLEAR
COLOR UR: YELLOW
EPI CELLS #/AREA URNS HPF: ABNORMAL /HPF (ref 0–5)
GLUCOSE UR STRIP-MCNC: NEGATIVE MG/DL
HGB UR QL STRIP.AUTO: NEGATIVE
KETONES UR STRIP-MCNC: NEGATIVE MG/DL
LEUKOCYTE ESTERASE UR QL STRIP: ABNORMAL
NITRITE UR QL STRIP: NEGATIVE
PH UR STRIP: 5.5 [PH] (ref 5–6)
PROT UR STRIP-MCNC: NEGATIVE MG/DL
RBC #/AREA URNS HPF: ABNORMAL /HPF (ref 0–4)
SP GR UR STRIP: 1.01 (ref 1.01–1.02)
UROBILINOGEN UR STRIP-ACNC: NORMAL EU/DL (ref 0–1)
WBC #/AREA URNS HPF: ABNORMAL /HPF (ref 0–4)

## 2024-02-11 PROCEDURE — 81001 URINALYSIS AUTO W/SCOPE: CPT

## 2024-02-11 ASSESSMENT — PATIENT HEALTH QUESTIONNAIRE - PHQ9
SUM OF ALL RESPONSES TO PHQ9 QUESTIONS 1 & 2: 0
SUM OF ALL RESPONSES TO PHQ QUESTIONS 1-9: 0
2. FEELING DOWN, DEPRESSED OR HOPELESS: 0
SUM OF ALL RESPONSES TO PHQ QUESTIONS 1-9: 0
1. LITTLE INTEREST OR PLEASURE IN DOING THINGS: 0
SUM OF ALL RESPONSES TO PHQ QUESTIONS 1-9: 0
SUM OF ALL RESPONSES TO PHQ QUESTIONS 1-9: 0

## 2024-03-12 ENCOUNTER — HOSPITAL ENCOUNTER (OUTPATIENT)
Age: 54
Discharge: HOME | End: 2024-03-12
Payer: COMMERCIAL

## 2024-03-12 LAB
SEND OUT REPORT: NORMAL
TEST NAME: NORMAL
TSH SERPL DL<=0.05 MIU/L-ACNC: 0.83 UIU/ML (ref 0.3–5)

## 2024-03-12 PROCEDURE — 84443 ASSAY THYROID STIM HORMONE: CPT

## 2024-03-12 PROCEDURE — 36415 COLL VENOUS BLD VENIPUNCTURE: CPT

## 2024-03-26 LAB
SEND OUT REPORT: NORMAL
TEST NAME: NORMAL

## 2024-07-15 ENCOUNTER — HOSPITAL ENCOUNTER (OUTPATIENT)
Age: 54
Discharge: HOME | End: 2024-07-15
Payer: COMMERCIAL

## 2024-07-15 LAB — TSH SERPL DL<=0.05 MIU/L-ACNC: 2.04 UIU/ML (ref 0.3–5)

## 2024-07-15 PROCEDURE — 36415 COLL VENOUS BLD VENIPUNCTURE: CPT

## 2024-07-15 PROCEDURE — 84443 ASSAY THYROID STIM HORMONE: CPT

## 2024-07-15 PROCEDURE — 84432 ASSAY OF THYROGLOBULIN: CPT

## 2024-07-15 PROCEDURE — 86800 THYROGLOBULIN ANTIBODY: CPT

## 2024-07-16 LAB — THYROGLOBULIN AB: 48 IU/ML (ref 0–40)

## 2024-07-19 LAB — THYROGLOB SERPL-MCNC: <0.5 NG/ML (ref 1.3–31.8)

## 2024-12-21 ENCOUNTER — HOSPITAL ENCOUNTER (OUTPATIENT)
Age: 54
Discharge: HOME | End: 2024-12-21
Payer: COMMERCIAL

## 2024-12-21 DIAGNOSIS — E11.9 TYPE 2 DIABETES MELLITUS WITHOUT COMPLICATION, UNSPECIFIED WHETHER LONG TERM INSULIN USE (HCC): ICD-10-CM

## 2024-12-21 DIAGNOSIS — Z00.00 WELLNESS EXAMINATION: ICD-10-CM

## 2024-12-21 DIAGNOSIS — I10 PRIMARY HYPERTENSION: ICD-10-CM

## 2024-12-21 LAB
ALBUMIN SERPL-MCNC: 4 G/DL (ref 3.5–5.2)
ALBUMIN/GLOB SERPL: 1.2 {RATIO} (ref 1–2.5)
ALP SERPL-CCNC: 90 U/L (ref 35–104)
ALT SERPL-CCNC: 42 U/L (ref 5–33)
ANION GAP SERPL CALCULATED.3IONS-SCNC: 12 MMOL/L (ref 9–17)
AST SERPL-CCNC: 30 U/L
BASOPHILS # BLD: 0.07 K/UL (ref 0–0.2)
BASOPHILS NFR BLD: 1 % (ref 0–2)
BILIRUB SERPL-MCNC: 0.4 MG/DL (ref 0.3–1.2)
BUN SERPL-MCNC: 9 MG/DL (ref 6–20)
BUN/CREAT SERPL: 13 (ref 9–20)
CALCIUM SERPL-MCNC: 8.8 MG/DL (ref 8.6–10.4)
CHLORIDE SERPL-SCNC: 102 MMOL/L (ref 98–107)
CHOLEST SERPL-MCNC: 192 MG/DL (ref 0–199)
CHOLESTEROL/HDL RATIO: 3
CO2 SERPL-SCNC: 26 MMOL/L (ref 20–31)
CREAT SERPL-MCNC: 0.7 MG/DL (ref 0.5–0.9)
EOSINOPHIL # BLD: 0.17 K/UL (ref 0–0.44)
EOSINOPHILS RELATIVE PERCENT: 3 % (ref 1–4)
ERYTHROCYTE [DISTWIDTH] IN BLOOD BY AUTOMATED COUNT: 11.9 % (ref 11.8–14.4)
GFR, ESTIMATED: >90 ML/MIN/1.73M2
GLUCOSE SERPL-MCNC: 129 MG/DL (ref 70–99)
HCT VFR BLD AUTO: 42.3 % (ref 36.3–47.1)
HDLC SERPL-MCNC: 64 MG/DL
HGB BLD-MCNC: 14 G/DL (ref 11.9–15.1)
IMM GRANULOCYTES # BLD AUTO: <0.03 K/UL (ref 0–0.3)
IMM GRANULOCYTES NFR BLD: 0 %
LDLC SERPL CALC-MCNC: 104 MG/DL (ref 0–100)
LYMPHOCYTES NFR BLD: 1.38 K/UL (ref 1.1–3.7)
LYMPHOCYTES RELATIVE PERCENT: 22 % (ref 24–43)
MCH RBC QN AUTO: 30.7 PG (ref 25.2–33.5)
MCHC RBC AUTO-ENTMCNC: 33.1 G/DL (ref 25.2–33.5)
MCV RBC AUTO: 92.8 FL (ref 82.6–102.9)
MONOCYTES NFR BLD: 0.51 K/UL (ref 0.1–1.2)
MONOCYTES NFR BLD: 8 % (ref 3–12)
NEUTROPHILS NFR BLD: 66 % (ref 36–65)
NEUTS SEG NFR BLD: 4.04 K/UL (ref 1.5–8.1)
NRBC BLD-RTO: 0 PER 100 WBC
PLATELET # BLD AUTO: 263 K/UL (ref 138–453)
PMV BLD AUTO: 9.2 FL (ref 8.1–13.5)
POTASSIUM SERPL-SCNC: 4.1 MMOL/L (ref 3.7–5.3)
PROT SERPL-MCNC: 7.4 G/DL (ref 6.4–8.3)
RBC # BLD AUTO: 4.56 M/UL (ref 3.95–5.11)
SODIUM SERPL-SCNC: 140 MMOL/L (ref 135–144)
TRIGL SERPL-MCNC: 120 MG/DL
VLDLC SERPL CALC-MCNC: 24 MG/DL (ref 1–30)
WBC OTHER # BLD: 6.2 K/UL (ref 3.5–11.3)

## 2024-12-21 PROCEDURE — 80053 COMPREHEN METABOLIC PANEL: CPT

## 2024-12-21 PROCEDURE — 85025 COMPLETE CBC W/AUTO DIFF WBC: CPT

## 2024-12-21 PROCEDURE — 36415 COLL VENOUS BLD VENIPUNCTURE: CPT

## 2024-12-21 PROCEDURE — 80061 LIPID PANEL: CPT

## 2025-03-29 ENCOUNTER — APPOINTMENT (OUTPATIENT)
Dept: GENERAL RADIOLOGY | Age: 55
End: 2025-03-29
Payer: COMMERCIAL

## 2025-03-29 ENCOUNTER — HOSPITAL ENCOUNTER (EMERGENCY)
Age: 55
Discharge: HOME | End: 2025-03-30
Attending: EMERGENCY MEDICINE
Payer: COMMERCIAL

## 2025-03-29 DIAGNOSIS — R07.89 ATYPICAL CHEST PAIN: Primary | ICD-10-CM

## 2025-03-29 PROCEDURE — 80048 BASIC METABOLIC PNL TOTAL CA: CPT

## 2025-03-29 PROCEDURE — 83690 ASSAY OF LIPASE: CPT

## 2025-03-29 PROCEDURE — 84443 ASSAY THYROID STIM HORMONE: CPT

## 2025-03-29 PROCEDURE — 85025 COMPLETE CBC W/AUTO DIFF WBC: CPT

## 2025-03-29 PROCEDURE — 83735 ASSAY OF MAGNESIUM: CPT

## 2025-03-29 PROCEDURE — 93005 ELECTROCARDIOGRAM TRACING: CPT | Performed by: EMERGENCY MEDICINE

## 2025-03-29 PROCEDURE — 84484 ASSAY OF TROPONIN QUANT: CPT

## 2025-03-29 PROCEDURE — 83880 ASSAY OF NATRIURETIC PEPTIDE: CPT

## 2025-03-29 PROCEDURE — 99285 EMERGENCY DEPT VISIT HI MDM: CPT

## 2025-03-29 RX ORDER — 0.9 % SODIUM CHLORIDE 0.9 %
1000 INTRAVENOUS SOLUTION INTRAVENOUS ONCE
Status: COMPLETED | OUTPATIENT
Start: 2025-03-30 | End: 2025-03-30

## 2025-03-29 ASSESSMENT — PAIN - FUNCTIONAL ASSESSMENT: PAIN_FUNCTIONAL_ASSESSMENT: 0-10

## 2025-03-29 ASSESSMENT — PAIN DESCRIPTION - LOCATION: LOCATION: CHEST

## 2025-03-29 ASSESSMENT — LIFESTYLE VARIABLES
HOW MANY STANDARD DRINKS CONTAINING ALCOHOL DO YOU HAVE ON A TYPICAL DAY: PATIENT DOES NOT DRINK
HOW OFTEN DO YOU HAVE A DRINK CONTAINING ALCOHOL: NEVER

## 2025-03-30 ENCOUNTER — APPOINTMENT (OUTPATIENT)
Dept: CT IMAGING | Age: 55
End: 2025-03-30
Payer: COMMERCIAL

## 2025-03-30 VITALS
OXYGEN SATURATION: 100 % | BODY MASS INDEX: 31.36 KG/M2 | SYSTOLIC BLOOD PRESSURE: 116 MMHG | TEMPERATURE: 98.5 F | HEART RATE: 91 BPM | WEIGHT: 177 LBS | DIASTOLIC BLOOD PRESSURE: 65 MMHG | HEIGHT: 63 IN | RESPIRATION RATE: 16 BRPM

## 2025-03-30 LAB
ANION GAP SERPL CALC-SCNC: 11 MEQ/L (ref 8–16)
BASOPHILS # BLD: 0.9 % (ref 0–3)
BASOPHILS ABSOLUTE: 0 THOU/MM3 (ref 0–0.1)
BUN SERPL-MCNC: 13 MG/DL (ref 7–18)
CALCIUM SERPL-MCNC: 9.1 MG/DL (ref 8.5–10.1)
CHLORIDE SERPL-SCNC: 100 MEQ/L (ref 98–107)
CO2 SERPL-SCNC: 28 MEQ/L (ref 21–32)
CREAT SERPL-MCNC: 0.9 MG/DL (ref 0.6–1.3)
EKG ATRIAL RATE: 106 BPM
EKG P AXIS: 59 DEGREES
EKG P-R INTERVAL: 148 MS
EKG Q-T INTERVAL: 360 MS
EKG QRS DURATION: 98 MS
EKG QTC CALCULATION (BAZETT): 478 MS
EKG R AXIS: -44 DEGREES
EKG T AXIS: 47 DEGREES
EKG VENTRICULAR RATE: 106 BPM
EOSINOPHILS ABSOLUTE: 0 THOU/MM3 (ref 0–0.5)
EOSINOPHILS RELATIVE PERCENT: 0.9 % (ref 0–4)
GFR SERPL CREATININE-BSD FRML MDRD: 76 ML/MIN/1.73M2
GLUCOSE SERPL-MCNC: 161 MG/DL (ref 74–106)
HCT VFR BLD CALC: 45.5 % (ref 37–47)
HEMOGLOBIN: 15.3 GM/DL (ref 12–16)
IMMATURE GRANS (ABS): 0 THOU/MM3 (ref 0–0.07)
IMMATURE GRANULOCYTES %: 0 %
LIPASE SERPL-CCNC: 36 U/L (ref 16–77)
LYMPHOCYTES # BLD AUTO: 17.6 % (ref 15–47)
LYMPHOCYTES ABSOLUTE: 0.8 THOU/MM3 (ref 1–4.8)
MAGNESIUM SERPL-MCNC: 2 MG/DL (ref 1.8–2.4)
MCH RBC QN AUTO: 30.6 PG (ref 26–32)
MCHC RBC AUTO-ENTMCNC: 33.6 GM/DL (ref 31–35)
MCV RBC AUTO: 91 FL (ref 81–99)
MONOCYTES: 0.5 THOU/MM3 (ref 0.3–1.3)
MONOCYTES: 9.9 % (ref 0–12)
NEUTROPHILS ABSOLUTE: 3.2 THOU/MM3 (ref 1.8–7.7)
NT PRO BNP: 52 PG/ML (ref 0–125)
PDW BLD-RTO: 11.7 % (ref 11.5–14.9)
PLATELET # BLD AUTO: 222 THOU/MM3 (ref 130–400)
PMV BLD AUTO: 9 FL (ref 9.4–12.4)
POTASSIUM SERPL-SCNC: 3.5 MEQ/L (ref 3.5–5.1)
RBC # BLD: 5 MILL/MM3 (ref 4.1–5.3)
SEG NEUTROPHILS: 70.5 % (ref 43–75)
SODIUM SERPL-SCNC: 139 MEQ/L (ref 136–145)
TROPONIN, HIGH SENSITIVITY: < 4 PG/ML (ref 0–51.3)
TSH SERPL DL<=0.05 MIU/L-ACNC: 2.07 UIU/ML (ref 0.27–4.2)
WBC # BLD: 4.6 THOU/MM3 (ref 4.8–10.8)

## 2025-03-30 PROCEDURE — 71275 CT ANGIOGRAPHY CHEST: CPT

## 2025-03-30 PROCEDURE — 2580000003 HC RX 258: Performed by: EMERGENCY MEDICINE

## 2025-03-30 PROCEDURE — 6360000004 HC RX CONTRAST MEDICATION: Performed by: EMERGENCY MEDICINE

## 2025-03-30 RX ORDER — IOPAMIDOL 755 MG/ML
100 INJECTION, SOLUTION INTRAVASCULAR
Status: COMPLETED | OUTPATIENT
Start: 2025-03-30 | End: 2025-03-30

## 2025-03-30 RX ADMIN — IOPAMIDOL 100 ML: 755 INJECTION, SOLUTION INTRAVENOUS at 00:46

## 2025-03-30 RX ADMIN — SODIUM CHLORIDE 1000 ML: 0.9 INJECTION, SOLUTION INTRAVENOUS at 00:23

## 2025-03-30 ASSESSMENT — PAIN SCALES - GENERAL
PAINLEVEL_OUTOF10: 0
PAINLEVEL_OUTOF10: 0

## 2025-03-30 ASSESSMENT — HEART SCORE: ECG: NON-SPECIFC REPOLARIZATION DISTURBANCE/LBTB/PM

## 2025-03-30 ASSESSMENT — PAIN - FUNCTIONAL ASSESSMENT
PAIN_FUNCTIONAL_ASSESSMENT: NONE - DENIES PAIN
PAIN_FUNCTIONAL_ASSESSMENT: NONE - DENIES PAIN

## 2025-03-30 NOTE — DISCHARGE INSTR - COC
Continuity of Care Form    Patient Name: Karena Mccormick   :  1970  MRN:  [id number]    Admit date:  3/29/2025  Discharge date:  ***    Code Status Order: No Order   Advance Directives:     Admitting Physician:  No admitting provider for patient encounter.  PCP: Nadiya Rodriguez, APRN - CNP    Discharging Nurse: ***  Discharging Hospital Unit/Room#: E1/E1  Discharging Unit Phone Number: ***    Emergency Contact:   Extended Emergency Contact Information  Primary Emergency Contact: Olman Mccormick  Address:  [address]  Home Phone: [phone]  Relation: Spouse  Secondary Emergency Contact: Jud[place] [address] [place]  Mobile Phone: [phone]  Relation: Parent    Past Surgical History:  Past Surgical History:   Procedure Laterality Date     SECTION  1996    CHOLECYSTECTOMY  1993    DILATION AND CURETTAGE OF UTERUS  1992    BRAXTON STEREO BREAST BX W LOC DEVICE 1ST LESION RIGHT Right 2016    SINUS SURGERY  ?    TUBAL LIGATION  2002       Immunization History:   Immunization History   Administered Date(s) Administered    COVID-19, MODERNA BLUE border, Primary or Immunocompromised, (age 12y+), IM, 100 mcg/0.5mL 2020, 2021, 2021    Influenza, FLUARIX, FLULAVAL, FLUZONE (age 6 mo+) and AFLURIA, (age 3 y+), Quadv PF, 0.5mL 10/17/2018, 10/29/2019, 10/22/2021    Influenza, FLUBLOK, (age 18 y+), Quadv PF, 0.5mL 10/04/2020    Pneumococcal, PCV20, PREVNAR 20, (age 6w+), IM, 0.5mL 2024    TDaP, ADACEL (age 10y-64y), BOOSTRIX (age 10y+), IM, 0.5mL 2010, 2017       Active Problems:  Patient Active Problem List   Diagnosis Code    Type 2 diabetes mellitus without complication (HCC) E11.9    Hypothyroidism E03.9    Primary hypertension I10    Depression F32.A    Dense breast tissue on mammogram R92.30       Isolation/Infection:   Isolation            No Isolation

## 2025-03-30 NOTE — ED PROVIDER NOTES
[facility] EMERGENCY SERVICES ENCOUNTER        PATIENT NAME: Karena Mccormick  MRN: [id number]  : 1970  IZQUIERDO: 3/29/2025  PROVIDER: Juan Vega MD    Patient was seen and evaluated at 11:46 PM EDT. Nurses Notes are reviewed and I agree except as noted in the HPI.  Chief Complaint   Patient presents with    Chest Pain     HISTORY OF PRESENT ILLNESS     A 54 y.o. female presents with chest pressure.     Intermittent chest pressure form mid chest area for the last 2-3 days worse tonight. She felt dizzy during the day and was sweating. Similar chest pain before but was never such severe. She denies Hx of CAD. No Hx of DVT or PE. Not on contraceptive.     Her PMH is remarkable for DM, HTN, Thyroid cancer s/p total thyroidectomy, hypothyroidism, and morbid obesity.     This HPI was provided by patient.     PAST MEDICAL HISTORY    has a past medical history of Diabetes mellitus (HCC).    SURGICAL HISTORY      has a past surgical history that includes Dilation and curettage of uterus (1992); Cholecystectomy (1993); sinus surgery (?);  section (1996); Tubal ligation (2002); and [facility] STEREO BREAST BX W LOC DEVICE 1ST LESION RIGHT (Right, 2016).    CURRENT MEDICATIONS       Previous Medications    BLOOD GLUCOSE TEST STRIPS (AGAMATRIX JAZZ TEST) STRIP    USE TO TEST ONE TIME DAILY    CYANOCOBALAMIN (VITAMIN B 12 PO)    Take 1 tablet by mouth daily    ESCITALOPRAM (LEXAPRO) 5 MG TABLET    Take 1 tablet by mouth daily    LEVOTHYROXINE (SYNTHROID) 137 MCG TABLET    Take 1 tablet by mouth daily Take 1 tablet a day Monday-Saturday and 1.5 tabs on     LOSARTAN (COZAAR) 50 MG TABLET    TAKE 1 TABLET BY MOUTH DAILY    METFORMIN (GLUCOPHAGE-XR) 500 MG EXTENDED RELEASE TABLET    Take 1 tablet by mouth 2 times daily    MULTIPLE VITAMIN (MULTIVITAMIN) CAPSULE    Take 1 capsule by mouth daily    PROBIOTIC PRODUCT (PROBIOTIC ADVANCED PO)    Take by mouth    SEMAGLUTIDE (OZEMPIC, 1

## 2025-03-30 NOTE — ED NOTES
Patient presents with complaint of chest pain and palpitations. States that she has had some chest pain this week but tonight it is worse. Denies recent illness. Denies recent injury. Skin is pink, warm and dry. Respirations are even and unlabored.

## 2025-04-12 ENCOUNTER — HOSPITAL ENCOUNTER (OUTPATIENT)
Age: 55
Discharge: HOME | End: 2025-04-12
Payer: COMMERCIAL

## 2025-04-12 DIAGNOSIS — R74.01 ELEVATED ALT MEASUREMENT: ICD-10-CM

## 2025-04-12 LAB
ALBUMIN SERPL-MCNC: 4.4 G/DL (ref 3.5–5.2)
ALBUMIN/GLOB SERPL: 1.5 {RATIO} (ref 1–2.5)
ALP SERPL-CCNC: 80 U/L (ref 35–104)
ALT SERPL-CCNC: 31 U/L (ref 10–35)
AST SERPL-CCNC: 18 U/L (ref 10–35)
BILIRUB DIRECT SERPL-MCNC: 0.1 MG/DL (ref 0–0.2)
BILIRUB INDIRECT SERPL-MCNC: 0.2 MG/DL (ref 0–1)
BILIRUB SERPL-MCNC: 0.3 MG/DL (ref 0–1.2)
PROT SERPL-MCNC: 7.4 G/DL (ref 6.6–8.7)
TSH SERPL DL<=0.05 MIU/L-ACNC: 3.14 UIU/ML (ref 0.27–4.2)

## 2025-04-12 PROCEDURE — 36415 COLL VENOUS BLD VENIPUNCTURE: CPT

## 2025-04-12 PROCEDURE — 84443 ASSAY THYROID STIM HORMONE: CPT

## 2025-04-12 PROCEDURE — 84432 ASSAY OF THYROGLOBULIN: CPT

## 2025-04-12 PROCEDURE — 80076 HEPATIC FUNCTION PANEL: CPT

## 2025-04-12 PROCEDURE — 86800 THYROGLOBULIN ANTIBODY: CPT

## 2025-04-14 LAB — THYROGLOBULIN AB: 39 IU/ML (ref 0–40)

## 2025-04-18 LAB — THYROGLOB SERPL-MCNC: <0.5 NG/ML (ref 1.3–31.8)

## 2025-04-23 ENCOUNTER — HOSPITAL ENCOUNTER (OUTPATIENT)
Dept: WOMENS IMAGING | Age: 55
Discharge: HOME | End: 2025-04-23
Payer: COMMERCIAL

## 2025-04-23 DIAGNOSIS — Z12.31 VISIT FOR SCREENING MAMMOGRAM: ICD-10-CM

## 2025-04-23 PROCEDURE — 77063 BREAST TOMOSYNTHESIS BI: CPT

## 2025-06-14 ENCOUNTER — HOSPITAL ENCOUNTER (OUTPATIENT)
Age: 55
Discharge: HOME | End: 2025-06-14
Payer: COMMERCIAL

## 2025-06-14 LAB — TSH SERPL DL<=0.05 MIU/L-ACNC: 0.27 UIU/ML (ref 0.27–4.2)

## 2025-06-14 PROCEDURE — 36415 COLL VENOUS BLD VENIPUNCTURE: CPT

## 2025-06-14 PROCEDURE — 84443 ASSAY THYROID STIM HORMONE: CPT

## 2025-06-14 PROCEDURE — 84432 ASSAY OF THYROGLOBULIN: CPT

## 2025-06-14 PROCEDURE — 86800 THYROGLOBULIN ANTIBODY: CPT

## 2025-06-17 LAB — THYROGLOBULIN AB: 34 IU/ML (ref 0–40)

## 2025-06-20 LAB — THYROGLOB SERPL-MCNC: <0.5 NG/ML (ref 1.3–31.8)

## 2025-08-26 ENCOUNTER — HOSPITAL ENCOUNTER (OUTPATIENT)
Dept: LAB | Age: 55
Discharge: HOME | End: 2025-08-26
Payer: COMMERCIAL

## 2025-08-26 LAB — TSH SERPL DL<=0.05 MIU/L-ACNC: 0.17 UIU/ML (ref 0.27–4.2)

## 2025-08-26 PROCEDURE — 36415 COLL VENOUS BLD VENIPUNCTURE: CPT

## 2025-08-26 PROCEDURE — 84443 ASSAY THYROID STIM HORMONE: CPT
